# Patient Record
Sex: MALE | Race: WHITE | NOT HISPANIC OR LATINO | Employment: FULL TIME | URBAN - METROPOLITAN AREA
[De-identification: names, ages, dates, MRNs, and addresses within clinical notes are randomized per-mention and may not be internally consistent; named-entity substitution may affect disease eponyms.]

---

## 2024-01-08 DIAGNOSIS — I48.0 PAROXYSMAL ATRIAL FIBRILLATION (MULTI): ICD-10-CM

## 2024-01-08 RX ORDER — FUROSEMIDE 20 MG/1
TABLET ORAL
Qty: 90 TABLET | Refills: 1 | Status: SHIPPED | OUTPATIENT
Start: 2024-01-08 | End: 2024-01-23 | Stop reason: SDUPTHER

## 2024-01-08 NOTE — TELEPHONE ENCOUNTER
15 day supply sent in a week ago with instructions to make an appointment for future refills. TCT pharmacy but unable to speak with anyone in pharmacy department. Left VMM for patient with same information on his self identified VM and with office number to call to schedule a FUV

## 2024-01-23 ENCOUNTER — TELEPHONE (OUTPATIENT)
Dept: CARDIOLOGY | Facility: CLINIC | Age: 64
End: 2024-01-23
Payer: COMMERCIAL

## 2024-01-23 DIAGNOSIS — I48.0 PAROXYSMAL ATRIAL FIBRILLATION (MULTI): ICD-10-CM

## 2024-01-23 NOTE — TELEPHONE ENCOUNTER
LOV May 2022. Patient is scheduled for next month but needs lasix in the meantime. Note states he is taking PRN. Will cue a 30 day supply and refill at appointment if needed

## 2024-01-24 RX ORDER — FUROSEMIDE 20 MG/1
20 TABLET ORAL DAILY
Qty: 30 TABLET | Refills: 0 | Status: SHIPPED | OUTPATIENT
Start: 2024-01-24 | End: 2024-07-24

## 2024-02-25 NOTE — PROGRESS NOTES
"Primary Care Physician: Davidson Castillo DO  Date of Visit: 02/26/2024  3:00 PM EST  Location of visit: [address]     Chief Complaint:   Follow up afib  Last visit 5/16/22       HPI / Summary:   Dandre Carroll is a 63 -year-old male with history of of paroxysmal A. fib and tachycardia induced cardiomyopathy in 2017 with cardioversion at that time and normalization of LVEF about 5 months later.     interval events:   Returns for follow-up about 1.5 years.  No complaints  Denies any palpitations, chest pain, dizziness, presyncope or syncope.  Previously he felt like his heart would go \"Franklin\" when he was in A-fib.  But back in A-fib today and feels no symptoms    He also has questions about a back lesion and ED.  Has not followed up with a primary care doctor since Dr. Castillo left .        ECG 2/26/2024: A-fib with RVR  bpm      ECG 5/2022: Normal sinus rhythm    TTE 2/2018 - LVEF 60%, aortic regurg not assessed  TTE 2017 - LVEF 30%, mild to mod AI     famhx: father with DM, heart issues aortic aneurysm with surgery  sochx: nonsmoker, ETOH 2-3 beers a day         Past Medical History:  Past Medical History:   Diagnosis Date    Personal history of other diseases of the circulatory system 01/28/2019    History of cardiomyopathy        Past Surgical History:  Past Surgical History:   Procedure Laterality Date    OTHER SURGICAL HISTORY  10/17/2019    Tonsillectomy    OTHER SURGICAL HISTORY  10/12/2021    Cataract surgery    OTHER SURGICAL HISTORY  10/12/2021    Colonoscopy          Social History:  He has no history on file for tobacco use, alcohol use, and drug use.    Family History:  family history is not on file.      Allergies:  Allergies   Allergen Reactions    Bee Venom Protein (Honey Bee) Swelling    Cat Dander Itching     Sinus congestion       Outpatient Medications:  Current Outpatient Medications   Medication Instructions    furosemide (LASIX) 20 mg, oral, Daily       Physical Exam:  GENERAL: " "alert, cooperative, pleasant, in no acute distress  SKIN: warm, dry, no rash.  Probably benign nevi on his back that is dark and circumferential.  He is going to follow-up with PCP about this.  NECK: no JVD, no ZAID  CARDIAC: Regular rate and rhythm with no rubs, murmurs, or gallops  CHEST: Normal respiratory efforts, lungs clear to auscultation bilaterally.  ABDOMEN: soft, nontender, nondistended  EXTREMITIES: no edema  NEURO: Alert and oriented x 3.  Grossly normal.  Moves all 4 extremities.    Vitals:    02/26/24 1523   BP: (!) 148/101   BP Location: Left arm   Patient Position: Sitting   Pulse: (!) 111   Resp: 16   SpO2: 97%   Weight: 75.3 kg (166 lb)   Height: 1.778 m (5' 10\")     Wt Readings from Last 5 Encounters:   02/13/23 70.3 kg (155 lb)   05/16/22 68 kg (150 lb)   10/12/21 68 kg (150 lb)   03/09/21 64.9 kg (143 lb)   03/16/20 70.1 kg (154 lb 7 oz)     Body mass index is 23.82 kg/m².        Last Labs:  CMP:  Recent Labs     10/13/21  0856 11/04/19  0823 10/22/19  0836   * 131* 127*   K 4.9 4.6 4.8   CL 99 97* 95*   CO2 24 26 24   ANIONGAP 14 13 13   BUN 10 8 6   CREATININE 0.77 0.70 0.66   GLUCOSE 90 82 75     Recent Labs     10/13/21  0856 11/04/19  0823 10/22/19  0836 10/24/17  0521 10/22/17  1222   ALBUMIN 4.4 4.2 4.5   < > 3.8   ALKPHOS 65  --  48  --  57   ALT 13  --  11  --  55*   AST 21  --  18  --  43*   BILITOT 0.8  --  0.8  --  1.7*    < > = values in this interval not displayed.     CBC:  Recent Labs     10/13/21  0856 11/04/19  0823 10/22/19  0836   WBC 4.8 6.5 4.9   HGB 12.1* 13.0* 12.3*   HCT 37.3* 39.3* 37.6*    258 232   MCV 93 93 93     COAG:   Recent Labs     10/23/17  1617 10/22/17  0940   INR 1.8* 1.4*     ENDO:  Recent Labs     10/13/21  0856 11/04/19  0921 10/23/17  0530   TSH  --  1.31 7.77*   HGBA1C 5.2  --  6.0      CARDIAC:   Recent Labs     10/13/21  0856 10/22/17  0940     --    BNP  --  886*     Recent Labs     10/13/21  0856 10/22/19  0836   CHOL 196 190 "   LDLF 107* 93   HDL 76.8 81.9   TRIG 62 76     No data recorded          Assessment/Plan   63-year-old male with history of of paroxysmal A. fib and tachycardia induced cardiomyopathy in 2017 with cardioversion at that time and normalization of LVEF about 5 months later.     Atrial fibrillation, possibly paroxysmal although may be persistent  - New since last seen a year and a half ago  - No clear attributable symptoms but prior cardiomyopathy so would favor rhythm control  - Continue carvedilol as  bpm today  - Start Eliquis 5 mg twice daily  - 1 week monitor.  If in persistent A-fib favor cardioversion in 3 to 4 weeks at [facility]    Chronic HTN  - Elevated today  - Continue carvedilol, lisinopril and furosemide  - Will follow-up BP depending on A-fib results    Some aortic regurgitation on echo in 2017 and suggest rechecking particularly with use of furosemide    Recommend establishing with primary care    Follow up 6-8 weeks        Followup Appts:  No future appointments.          ____________________________________________________________  Brady Cota DO  [facility] [place]  [facility]

## 2024-02-26 ENCOUNTER — OFFICE VISIT (OUTPATIENT)
Dept: CARDIOLOGY | Facility: CLINIC | Age: 64
End: 2024-02-26
Payer: COMMERCIAL

## 2024-02-26 VITALS
HEIGHT: 70 IN | RESPIRATION RATE: 16 BRPM | WEIGHT: 166 LBS | BODY MASS INDEX: 23.77 KG/M2 | DIASTOLIC BLOOD PRESSURE: 101 MMHG | HEART RATE: 111 BPM | SYSTOLIC BLOOD PRESSURE: 148 MMHG | OXYGEN SATURATION: 97 %

## 2024-02-26 DIAGNOSIS — I35.1 NONRHEUMATIC AORTIC VALVE INSUFFICIENCY: ICD-10-CM

## 2024-02-26 DIAGNOSIS — I48.0 PAROXYSMAL ATRIAL FIBRILLATION (MULTI): Primary | ICD-10-CM

## 2024-02-26 DIAGNOSIS — I10 CHRONIC HYPERTENSION: ICD-10-CM

## 2024-02-26 PROCEDURE — 3077F SYST BP >= 140 MM HG: CPT | Performed by: INTERNAL MEDICINE

## 2024-02-26 PROCEDURE — 99215 OFFICE O/P EST HI 40 MIN: CPT | Performed by: INTERNAL MEDICINE

## 2024-02-26 PROCEDURE — 93005 ELECTROCARDIOGRAM TRACING: CPT | Performed by: INTERNAL MEDICINE

## 2024-02-26 PROCEDURE — 3080F DIAST BP >= 90 MM HG: CPT | Performed by: INTERNAL MEDICINE

## 2024-02-26 PROCEDURE — 1036F TOBACCO NON-USER: CPT | Performed by: INTERNAL MEDICINE

## 2024-02-26 RX ORDER — MAGNESIUM GLYCINATE 100 MG
CAPSULE ORAL DAILY
COMMUNITY

## 2024-02-26 RX ORDER — LISINOPRIL 10 MG/1
10 TABLET ORAL DAILY
COMMUNITY

## 2024-02-26 RX ORDER — SILDENAFIL 25 MG/1
TABLET, FILM COATED ORAL
COMMUNITY
Start: 2021-10-12

## 2024-02-26 RX ORDER — MULTIVIT WITH IRON,MINERALS
TABLET,CHEWABLE ORAL
COMMUNITY

## 2024-02-26 RX ORDER — LANOLIN ALCOHOL/MO/W.PET/CERES
1 CREAM (GRAM) TOPICAL DAILY
COMMUNITY

## 2024-02-26 RX ORDER — CARVEDILOL 6.25 MG/1
6.25 TABLET ORAL
COMMUNITY
End: 2024-06-05

## 2024-02-26 RX ORDER — ASPIRIN 81 MG/1
1 TABLET ORAL EVERY OTHER DAY
COMMUNITY
Start: 2017-10-29 | End: 2024-02-26 | Stop reason: ALTCHOICE

## 2024-02-26 RX ORDER — ACETAMINOPHEN 325 MG/1
325-650 TABLET ORAL EVERY 4 HOURS PRN
COMMUNITY
Start: 2013-12-12

## 2024-02-26 ASSESSMENT — PATIENT HEALTH QUESTIONNAIRE - PHQ9
1. LITTLE INTEREST OR PLEASURE IN DOING THINGS: NOT AT ALL
SUM OF ALL RESPONSES TO PHQ9 QUESTIONS 1 AND 2: 0
2. FEELING DOWN, DEPRESSED OR HOPELESS: NOT AT ALL

## 2024-02-26 ASSESSMENT — COLUMBIA-SUICIDE SEVERITY RATING SCALE - C-SSRS
6. HAVE YOU EVER DONE ANYTHING, STARTED TO DO ANYTHING, OR PREPARED TO DO ANYTHING TO END YOUR LIFE?: NO
1. IN THE PAST MONTH, HAVE YOU WISHED YOU WERE DEAD OR WISHED YOU COULD GO TO SLEEP AND NOT WAKE UP?: NO
2. HAVE YOU ACTUALLY HAD ANY THOUGHTS OF KILLING YOURSELF?: NO

## 2024-02-26 NOTE — PATIENT INSTRUCTIONS
Today we reviewed that you are back in atrial fibrillation with a somewhat rapid rate.  No clear attributable symptoms that you can ascertain.  Would recommend a 1 week monitor and if in A-fib persistently we should proceed with another cardioversion.  I will also prescribe Eliquis to take twice a day.  Please do not miss any doses.  We will perform cardioversion in about 1 month if you are persistently in atrial fibrillation.  I have also ordered additional blood work and an echocardiogram to be completed in the meantime    Can reestablish with primary care here in [place] regarding your nodule on the back and ED.  Perhaps your ED is related to atrial fibrillation which may improve as we treat that.    Stop aspirin when taking eliquis

## 2024-02-27 LAB
ATRIAL RATE: 108 BPM
Q ONSET: 221 MS
QRS COUNT: 18 BEATS
QRS DURATION: 88 MS
QT INTERVAL: 344 MS
QTC CALCULATION(BAZETT): 465 MS
QTC FREDERICIA: 421 MS
R AXIS: 49 DEGREES
T AXIS: 42 DEGREES
T OFFSET: 393 MS
VENTRICULAR RATE: 110 BPM

## 2024-03-08 ENCOUNTER — HOSPITAL ENCOUNTER (OUTPATIENT)
Dept: CARDIOLOGY | Facility: CLINIC | Age: 64
Discharge: HOME | End: 2024-03-08
Payer: COMMERCIAL

## 2024-03-08 DIAGNOSIS — I48.0 PAROXYSMAL ATRIAL FIBRILLATION (MULTI): ICD-10-CM

## 2024-03-08 LAB
AORTIC VALVE PEAK VELOCITY: 1.16 M/S
AV PEAK GRADIENT: 5.4 MMHG
AVA (PEAK VEL): 2.62 CM2
EJECTION FRACTION APICAL 4 CHAMBER: 57.1
EJECTION FRACTION: 57 %
LEFT ATRIUM VOLUME AREA LENGTH INDEX BSA: 46.7 ML/M2
LEFT VENTRICULAR OUTFLOW TRACT DIAMETER: 2.19 CM
RIGHT VENTRICLE FREE WALL PEAK S': 9.68 CM/S
RIGHT VENTRICLE PEAK SYSTOLIC PRESSURE: 28.8 MMHG
TRICUSPID ANNULAR PLANE SYSTOLIC EXCURSION: 1.9 CM

## 2024-03-08 PROCEDURE — 93248 EXT ECG>7D<15D REV&INTERPJ: CPT | Performed by: INTERNAL MEDICINE

## 2024-03-08 PROCEDURE — 93246 EXT ECG>7D<15D RECORDING: CPT

## 2024-03-08 PROCEDURE — 93306 TTE W/DOPPLER COMPLETE: CPT

## 2024-03-08 PROCEDURE — 93306 TTE W/DOPPLER COMPLETE: CPT | Performed by: INTERNAL MEDICINE

## 2024-03-14 ENCOUNTER — LAB (OUTPATIENT)
Dept: LAB | Facility: LAB | Age: 64
End: 2024-03-14
Payer: COMMERCIAL

## 2024-03-14 DIAGNOSIS — I48.0 PAROXYSMAL ATRIAL FIBRILLATION (MULTI): ICD-10-CM

## 2024-03-14 LAB
ALBUMIN SERPL BCP-MCNC: 4.5 G/DL (ref 3.4–5)
ALP SERPL-CCNC: 51 U/L (ref 33–136)
ALT SERPL W P-5'-P-CCNC: 12 U/L (ref 10–52)
ANION GAP SERPL CALC-SCNC: 12 MMOL/L (ref 10–20)
AST SERPL W P-5'-P-CCNC: 16 U/L (ref 9–39)
BILIRUB SERPL-MCNC: 0.9 MG/DL (ref 0–1.2)
BUN SERPL-MCNC: 14 MG/DL (ref 6–23)
CALCIUM SERPL-MCNC: 9.5 MG/DL (ref 8.6–10.6)
CHLORIDE SERPL-SCNC: 98 MMOL/L (ref 98–107)
CHOLEST SERPL-MCNC: 205 MG/DL (ref 0–199)
CHOLESTEROL/HDL RATIO: 2.4
CO2 SERPL-SCNC: 29 MMOL/L (ref 21–32)
CREAT SERPL-MCNC: 0.94 MG/DL (ref 0.5–1.3)
EGFRCR SERPLBLD CKD-EPI 2021: >90 ML/MIN/1.73M*2
ERYTHROCYTE [DISTWIDTH] IN BLOOD BY AUTOMATED COUNT: 12.7 % (ref 11.5–14.5)
GLUCOSE SERPL-MCNC: 88 MG/DL (ref 74–99)
HCT VFR BLD AUTO: 42 % (ref 41–52)
HDLC SERPL-MCNC: 85.2 MG/DL
HGB BLD-MCNC: 13.9 G/DL (ref 13.5–17.5)
LDLC SERPL CALC-MCNC: 104 MG/DL
MCH RBC QN AUTO: 30.5 PG (ref 26–34)
MCHC RBC AUTO-ENTMCNC: 33.1 G/DL (ref 32–36)
MCV RBC AUTO: 92 FL (ref 80–100)
NON HDL CHOLESTEROL: 120 MG/DL (ref 0–149)
NRBC BLD-RTO: 0 /100 WBCS (ref 0–0)
PLATELET # BLD AUTO: 237 X10*3/UL (ref 150–450)
POTASSIUM SERPL-SCNC: 5 MMOL/L (ref 3.5–5.3)
PROT SERPL-MCNC: 6.8 G/DL (ref 6.4–8.2)
RBC # BLD AUTO: 4.56 X10*6/UL (ref 4.5–5.9)
SODIUM SERPL-SCNC: 134 MMOL/L (ref 136–145)
TRIGL SERPL-MCNC: 79 MG/DL (ref 0–149)
TSH SERPL-ACNC: 1.5 MIU/L (ref 0.44–3.98)
VLDL: 16 MG/DL (ref 0–40)
WBC # BLD AUTO: 5 X10*3/UL (ref 4.4–11.3)

## 2024-03-14 PROCEDURE — 80061 LIPID PANEL: CPT

## 2024-03-14 PROCEDURE — 84443 ASSAY THYROID STIM HORMONE: CPT

## 2024-03-14 PROCEDURE — 85027 COMPLETE CBC AUTOMATED: CPT

## 2024-03-14 PROCEDURE — 80053 COMPREHEN METABOLIC PANEL: CPT

## 2024-03-14 PROCEDURE — 36415 COLL VENOUS BLD VENIPUNCTURE: CPT

## 2024-03-20 ENCOUNTER — TELEPHONE (OUTPATIENT)
Dept: CARDIOLOGY | Facility: CLINIC | Age: 64
End: 2024-03-20
Payer: COMMERCIAL

## 2024-03-20 NOTE — TELEPHONE ENCOUNTER
----- Message from Brady Cota, DO sent at 3/11/2024 10:04 PM EDT -----  Echo looks ok. Do we have monitor results yet? If persistent afib, favor DCC.

## 2024-03-20 NOTE — TELEPHONE ENCOUNTER
TCT patient and left detailed message on self identified VM regarding normal lab/echo results and that we will decide on cardioversion once event monitor is sent back and reviewed. Instructed to call the office with any questions/concerns

## 2024-03-21 ENCOUNTER — APPOINTMENT (OUTPATIENT)
Dept: CARDIOLOGY | Facility: HOSPITAL | Age: 64
End: 2024-03-21
Payer: COMMERCIAL

## 2024-04-02 DIAGNOSIS — I48.0 PAROXYSMAL ATRIAL FIBRILLATION (MULTI): Primary | ICD-10-CM

## 2024-04-05 DIAGNOSIS — I48.0 PAROXYSMAL ATRIAL FIBRILLATION (MULTI): ICD-10-CM

## 2024-04-05 NOTE — TELEPHONE ENCOUNTER
Patient will defer DCCV until after EP consult. Script cued and forwarded for signature per his request.

## 2024-04-11 ENCOUNTER — APPOINTMENT (OUTPATIENT)
Dept: CARDIOLOGY | Facility: HOSPITAL | Age: 64
End: 2024-04-11
Payer: COMMERCIAL

## 2024-04-23 ENCOUNTER — OFFICE VISIT (OUTPATIENT)
Dept: CARDIOLOGY | Facility: CLINIC | Age: 64
End: 2024-04-23
Payer: COMMERCIAL

## 2024-04-23 VITALS
HEIGHT: 70 IN | DIASTOLIC BLOOD PRESSURE: 94 MMHG | OXYGEN SATURATION: 98 % | WEIGHT: 168.9 LBS | HEART RATE: 113 BPM | SYSTOLIC BLOOD PRESSURE: 133 MMHG | BODY MASS INDEX: 24.18 KG/M2

## 2024-04-23 DIAGNOSIS — I48.0 PAROXYSMAL ATRIAL FIBRILLATION (MULTI): ICD-10-CM

## 2024-04-23 PROCEDURE — 99204 OFFICE O/P NEW MOD 45 MIN: CPT | Performed by: INTERNAL MEDICINE

## 2024-04-23 PROCEDURE — 99214 OFFICE O/P EST MOD 30 MIN: CPT | Performed by: INTERNAL MEDICINE

## 2024-04-23 PROCEDURE — 93005 ELECTROCARDIOGRAM TRACING: CPT | Performed by: INTERNAL MEDICINE

## 2024-04-23 PROCEDURE — 1036F TOBACCO NON-USER: CPT | Performed by: INTERNAL MEDICINE

## 2024-04-23 RX ORDER — FLECAINIDE ACETATE 100 MG/1
100 TABLET ORAL 2 TIMES DAILY
Qty: 60 TABLET | Refills: 11 | Status: SHIPPED | OUTPATIENT
Start: 2024-04-23 | End: 2024-06-05 | Stop reason: ALTCHOICE

## 2024-04-23 ASSESSMENT — PAIN SCALES - GENERAL: PAINLEVEL: 0-NO PAIN

## 2024-04-23 ASSESSMENT — COLUMBIA-SUICIDE SEVERITY RATING SCALE - C-SSRS
6. HAVE YOU EVER DONE ANYTHING, STARTED TO DO ANYTHING, OR PREPARED TO DO ANYTHING TO END YOUR LIFE?: NO
2. HAVE YOU ACTUALLY HAD ANY THOUGHTS OF KILLING YOURSELF?: NO
1. IN THE PAST MONTH, HAVE YOU WISHED YOU WERE DEAD OR WISHED YOU COULD GO TO SLEEP AND NOT WAKE UP?: NO

## 2024-04-23 ASSESSMENT — PATIENT HEALTH QUESTIONNAIRE - PHQ9
SUM OF ALL RESPONSES TO PHQ9 QUESTIONS 1 AND 2: 0
1. LITTLE INTEREST OR PLEASURE IN DOING THINGS: NOT AT ALL
2. FEELING DOWN, DEPRESSED OR HOPELESS: NOT AT ALL

## 2024-04-23 ASSESSMENT — ENCOUNTER SYMPTOMS
OCCASIONAL FEELINGS OF UNSTEADINESS: 0
DEPRESSION: 0
LOSS OF SENSATION IN FEET: 0

## 2024-04-23 NOTE — PROGRESS NOTES
Referred by Brady Cruz DO provider found for No chief complaint on file.       Dandre Carroll is a 63 y.o. year old male patient with h/o paroxysmal A. fib and tachycardia induced cardiomyopathy in 2017 with cardioversion at that time and normalization of LVEF about 5 months later. The patient was seen in his cardiologist office and found to be now in persistent A Fib. Was referred to me for evaluation for RF ablation.      PMHx/PSHx: As above    FamHx: unremarkable     Allergies:  Allergies   Allergen Reactions    Bee Venom Protein (Honey Bee) Swelling    Cat Dander Itching     Sinus congestion        Review of Systems    Constitutional: not feeling tired.   Eyes: no eyesight problems.   ENT: no hearing loss and no nosebleeds.   Cardiovascular: no intermittent leg claudication and as noted in HPI.   Respiratory: no chronic cough and no shortness of breath.   Gastrointestinal: no change in bowel habits and no blood in stools.   Genitourinary: no urinary frequency and no hematuria.   Skin: no skin rashes.   Neurological: no seizures and no frequent falls.   Psychiatric: no depression and not suicidal.   All other systems have been reviewed and are negative for complaint.     Outpatient Medications:  Current Outpatient Medications   Medication Instructions    acetaminophen (TYLENOL) 325-650 mg, oral, Every 4 hours PRN    apixaban (ELIQUIS) 5 mg, oral, 2 times daily    carvedilol (COREG) 6.25 mg, oral, 2 times daily with meals    folic acid-vit B6-vit B12 (Folbic) 2.5-25-2 mg tablet 1 tablet, oral, Daily    furosemide (LASIX) 20 mg, oral, Daily    glucosamine-chondroitin 250-200 mg tablet oral    lisinopril 10 mg, oral, Daily, as directed    magnesium glycinate 100 mg magnesium capsule oral, Daily    sildenafil (Viagra) 25 mg tablet oral    thiamine 100 mg tablet 1 tablet, oral, Daily         Last Recorded Vitals:      3/16/2020     3:51 PM 3/9/2021     3:03 PM 10/12/2021     9:33 AM 5/16/2022     4:09 PM  "2/13/2023    10:41 AM 2/26/2024     3:23 PM   Vitals   Systolic 134 119 122 111 144 148   Diastolic 76 65 68 61 90 101   Heart Rate 70 58 53 54 96 111   Temp   35.7 °C (96.3 °F)  36.1 °C (97 °F)    Resp   116  16 16   Height (in) 1.778 m (5' 10\")  1.721 m (5' 7.75\") 1.721 m (5' 7.75\") 1.778 m (5' 10\") 1.778 m (5' 10\")   Weight (lb) 154.44 143 150 150 155 166   BMI 22.16 kg/m2 20.52 kg/m2 22.98 kg/m2 22.98 kg/m2 22.24 kg/m2 23.82 kg/m2   BSA (m2) 1.86 m2 1.79 m2 1.8 m2 1.8 m2 1.86 m2 1.93 m2   Visit Report      Report    Visit Vitals  Smoking Status Former        Physical Exam:  Constitutional: alert and in no acute distress.   Eyes: no erythema, swelling or discharge from the eye .   Neck: neck is supple, symmetric, trachea midline, no masses  and no thyromegaly .   Pulmonary: no increased work of breathing or signs of respiratory distress  and lungs clear to auscultation.    Cardiovascular: carotid pulses 2+ bilaterally with no bruit , JVP was normal, no thrills , regular rhythm, normal S1 and S2, no murmurs , pedal pulses 2+ bilaterally  and no edema .   Abdomen: abdomen non-tender, no masses  and no hepatomegaly .   Skin: skin warm and dry, normal skin turgor .   Psychiatric judgment and insight is normal  and oriented to person, place and time .        Assessment/Plan   Problem List Items Addressed This Visit    None  Visit Diagnoses         Codes    Paroxysmal atrial fibrillation (Multi)     I48.0            Dandre Carroll is a 63 y.o. year old male patient with h/o paroxysmal A. fib and tachycardia induced cardiomyopathy in 2017 with cardioversion at that time and normalization of LVEF about 5 months later. The patient was seen in his cardiologist office and found to be now in persistent A Fib. Was referred to me for evaluation for RF ablation.    His current ECG shows A Fib with narrow QRS and HR of 113 bpm. I think the patient will benefit from rhythm control. I discussed with him and his wife his options " including DCC + AAD vs RF ablation. All the R/B/A of these options were discussed with the patient who expressed understanding. He would like to try medications first. His most recent echocardiogram showed a preserved LVEF. Will get him started on Flecainide 100 mg bid and will proceed with DCC as scheduled by Dr. Cota. If he experiences recurrence on the medication then will proceed with ablation.       Renato Reeves MD  Cardiac Electrophysiology      Thank you very much for allowing me to participate in the care of this pleasant patient. Please do not hesitate to contact me with any further questions or concerns regarding his care.    **Disclaimer: This note was dictated by speech recognition, and every effort has been made to prevent any error in transcription, however minor errors may be present**

## 2024-04-24 DIAGNOSIS — I48.0 PAROXYSMAL ATRIAL FIBRILLATION (MULTI): Primary | ICD-10-CM

## 2024-04-24 RX ORDER — SODIUM CHLORIDE 9 MG/ML
100 INJECTION, SOLUTION INTRAVENOUS CONTINUOUS
OUTPATIENT
Start: 2024-04-24

## 2024-04-25 ENCOUNTER — HOSPITAL ENCOUNTER (OUTPATIENT)
Dept: CARDIOLOGY | Facility: HOSPITAL | Age: 64
Discharge: HOME | End: 2024-04-25
Payer: COMMERCIAL

## 2024-04-25 ENCOUNTER — ANESTHESIA EVENT (OUTPATIENT)
Dept: CARDIOLOGY | Facility: HOSPITAL | Age: 64
End: 2024-04-25
Payer: COMMERCIAL

## 2024-04-25 ENCOUNTER — ANESTHESIA (OUTPATIENT)
Dept: CARDIOLOGY | Facility: HOSPITAL | Age: 64
End: 2024-04-25
Payer: COMMERCIAL

## 2024-04-25 VITALS
HEART RATE: 78 BPM | OXYGEN SATURATION: 97 % | RESPIRATION RATE: 10 BRPM | WEIGHT: 165 LBS | BODY MASS INDEX: 23.62 KG/M2 | DIASTOLIC BLOOD PRESSURE: 79 MMHG | TEMPERATURE: 98.4 F | SYSTOLIC BLOOD PRESSURE: 133 MMHG | HEIGHT: 70 IN

## 2024-04-25 DIAGNOSIS — I48.0 PAROXYSMAL ATRIAL FIBRILLATION (MULTI): ICD-10-CM

## 2024-04-25 LAB
ATRIAL RATE: 97 BPM
BODY SURFACE AREA: 1.92 M2
Q ONSET: 226 MS
QRS COUNT: 18 BEATS
QRS DURATION: 72 MS
QT INTERVAL: 324 MS
QTC CALCULATION(BAZETT): 444 MS
QTC FREDERICIA: 400 MS
R AXIS: 27 DEGREES
T AXIS: 29 DEGREES
T OFFSET: 388 MS
VENTRICULAR RATE: 113 BPM

## 2024-04-25 PROCEDURE — 3700000002 HC GENERAL ANESTHESIA TIME - EACH INCREMENTAL 1 MINUTE

## 2024-04-25 PROCEDURE — 7100000009 HC PHASE TWO TIME - INITIAL BASE CHARGE

## 2024-04-25 PROCEDURE — 92960 CARDIOVERSION ELECTRIC EXT: CPT | Performed by: INTERNAL MEDICINE

## 2024-04-25 PROCEDURE — 7100000010 HC PHASE TWO TIME - EACH INCREMENTAL 1 MINUTE

## 2024-04-25 PROCEDURE — A92960 PR CARDIOVERSION, ELECTIVE;EXTERN: Performed by: STUDENT IN AN ORGANIZED HEALTH CARE EDUCATION/TRAINING PROGRAM

## 2024-04-25 PROCEDURE — A92960 PR CARDIOVERSION, ELECTIVE;EXTERN: Performed by: ANESTHESIOLOGIST ASSISTANT

## 2024-04-25 PROCEDURE — 3700000001 HC GENERAL ANESTHESIA TIME - INITIAL BASE CHARGE

## 2024-04-25 PROCEDURE — 2500000004 HC RX 250 GENERAL PHARMACY W/ HCPCS (ALT 636 FOR OP/ED): Performed by: ANESTHESIOLOGIST ASSISTANT

## 2024-04-25 PROCEDURE — 92960 CARDIOVERSION ELECTRIC EXT: CPT

## 2024-04-25 PROCEDURE — 2500000005 HC RX 250 GENERAL PHARMACY W/O HCPCS: Performed by: ANESTHESIOLOGIST ASSISTANT

## 2024-04-25 PROCEDURE — 99222 1ST HOSP IP/OBS MODERATE 55: CPT | Performed by: NURSE PRACTITIONER

## 2024-04-25 RX ORDER — PROPOFOL 10 MG/ML
INJECTION, EMULSION INTRAVENOUS AS NEEDED
Status: DISCONTINUED | OUTPATIENT
Start: 2024-04-25 | End: 2024-04-25

## 2024-04-25 RX ORDER — LIDOCAINE HYDROCHLORIDE 20 MG/ML
INJECTION, SOLUTION EPIDURAL; INFILTRATION; INTRACAUDAL; PERINEURAL AS NEEDED
Status: DISCONTINUED | OUTPATIENT
Start: 2024-04-25 | End: 2024-04-25

## 2024-04-25 RX ADMIN — LIDOCAINE HYDROCHLORIDE 50 MG: 20 INJECTION, SOLUTION EPIDURAL; INFILTRATION; INTRACAUDAL; PERINEURAL at 13:04

## 2024-04-25 RX ADMIN — PROPOFOL 50 MG: 10 INJECTION, EMULSION INTRAVENOUS at 13:04

## 2024-04-25 RX ADMIN — SODIUM CHLORIDE: 9 INJECTION, SOLUTION INTRAVENOUS at 12:59

## 2024-04-25 ASSESSMENT — ENCOUNTER SYMPTOMS
GASTROINTESTINAL NEGATIVE: 1
NEUROLOGICAL NEGATIVE: 1
CARDIOVASCULAR NEGATIVE: 1
EYES NEGATIVE: 1
HEMATOLOGIC/LYMPHATIC NEGATIVE: 1
CONSTITUTIONAL NEGATIVE: 1
MUSCULOSKELETAL NEGATIVE: 1
RESPIRATORY NEGATIVE: 1
ENDOCRINE NEGATIVE: 1
PSYCHIATRIC NEGATIVE: 1
ALLERGIC/IMMUNOLOGIC NEGATIVE: 1

## 2024-04-25 ASSESSMENT — PAIN SCALES - GENERAL
PAINLEVEL_OUTOF10: 0 - NO PAIN

## 2024-04-25 ASSESSMENT — COLUMBIA-SUICIDE SEVERITY RATING SCALE - C-SSRS
1. IN THE PAST MONTH, HAVE YOU WISHED YOU WERE DEAD OR WISHED YOU COULD GO TO SLEEP AND NOT WAKE UP?: NO
2. HAVE YOU ACTUALLY HAD ANY THOUGHTS OF KILLING YOURSELF?: NO
6. HAVE YOU EVER DONE ANYTHING, STARTED TO DO ANYTHING, OR PREPARED TO DO ANYTHING TO END YOUR LIFE?: NO

## 2024-04-25 ASSESSMENT — PAIN - FUNCTIONAL ASSESSMENT
PAIN_FUNCTIONAL_ASSESSMENT: 0-10

## 2024-04-25 NOTE — DISCHARGE INSTRUCTIONS
No changes to medications.     Follow up with Dr. Cota in the next 4-6 weeks. Appointment requested. Please call the office if you do not hear anything in 3 business days or if you need to reschedule.                  Cardioversion     Cardioversion is a non-surgical way to restore your heart's normal rhythm. Medication may be tried first to correct an irregular heartbeat, but if medicines do not work, electrical cardioversion may be needed. The electrical current should make your heartbeat normally again.      After the procedure-    Your heart rate, blood pressure and respirations will be checked frequently by the nurse until you are fully alert.      When the patches are removed form your chest, you may notice redness, irritation, or itching similar to a mild sunburn. You may You may use over the counter hydrocortisone 1% cream and apply up to three times a day for any irritation to your skin on your chest.      Discharge information-   Have an adult with you to get you home from the hospital; you will not be allowed to drive for 24 hours after the procedure.      You may resume your usual routine after discharge.      Make sure you and your family understands how you are to take your medications. The doctor will tell you what to do with your cardiac medicines and your anti-coagulation medicines.      There is a small chance your irregular heartbeat may return. If you feel skipped beats, rapid heart rate, or chest tightness, call your doctor.

## 2024-04-25 NOTE — ANESTHESIA PREPROCEDURE EVALUATION
Patient: Dandre Carroll    Procedure Information       Date/Time: 04/25/24 1300    Scheduled providers: Fred Parmar MD; MANOLO Whitaker; Brady Cota DO    Procedure: CARDIOVERSION EXTERNAL    Location: [facility]            Relevant Problems   No relevant active problems       Clinical information reviewed:   Tobacco  Allergies  Meds   Med Hx  Surg Hx   Fam Hx  Soc Hx        NPO Detail:  NPO/Void Status  Date of Last Liquid: 04/25/24  Time of Last Liquid: 0700  Date of Last Solid: 04/24/24  Time of Last Solid: 2000         Physical Exam    Airway  Mallampati: II  TM distance: >3 FB  Neck ROM: full     Cardiovascular   Rhythm: regular  Rate: normal     Dental    Pulmonary   Breath sounds clear to auscultation     Abdominal            Anesthesia Plan    History of general anesthesia?: yes  History of complications of general anesthesia?: no    ASA 3     MAC     intravenous induction   Anesthetic plan and risks discussed with patient.    Plan discussed with CRNA.

## 2024-04-25 NOTE — ANESTHESIA POSTPROCEDURE EVALUATION
Patient: Dandre Carroll    Procedure Summary       Date: 04/25/24 Room / Location: [facility]    Anesthesia Start: 1259 Anesthesia Stop: 1309    Procedure: CARDIOVERSION EXTERNAL Diagnosis: Paroxysmal atrial fibrillation (Multi)    Scheduled Providers: Fred Parmar MD; MANOLO Whitaker; Brady Cota DO Responsible Provider: Mauricio Mena MD    Anesthesia Type: MAC ASA Status: 3            Anesthesia Type: MAC    Vitals Value Taken Time   /79 04/25/24 1356   Temp  04/25/24 1444   Pulse 78 04/25/24 1356   Resp 10 04/25/24 1356   SpO2 97 % 04/25/24 1356       Anesthesia Post Evaluation    Patient location during evaluation: bedside  Patient participation: complete - patient participated  Level of consciousness: awake  Pain management: adequate  Multimodal analgesia pain management approach  Airway patency: patent  Cardiovascular status: stable  Respiratory status: spontaneous ventilation and unassisted  Hydration status: acceptable  Postoperative Nausea and Vomiting: none  Comments: No significant PONV.        No notable events documented.

## 2024-04-25 NOTE — NURSING NOTE
Home going instructions specific to procedure given. Easily arousable; responding appropriately. Vs +/- 20% of pre procedure status. Significant complications are absent. Ambulates without dizziness/age appropriate activity, pulse ox above or equal to 92% on room air/ordered oxygen treatment. Care Plan Complete. Discharge to home accompanied by (family). Discharged via wheelchair.

## 2024-04-25 NOTE — H&P
History Of Present Illness  Dandre Carroll is a 63 y.o. male presenting with atrial fibrillation (on Eliquis, Flecainide, Carvedilol) here for DCCV. PMHx significant for  atrial fibrillation and tachycardia induced cardiomyopathy in 2017 with cardioversion and normalization of LVEF, HTN, aortic regurgitation.     Denies missed doses of Eliquis over the past 30 days.      Past Medical History  He has a past medical history of Atrial fibrillation (Multi), Hypertension, and Personal history of other diseases of the circulatory system (01/28/2019).    Surgical History  He has a past surgical history that includes Other surgical history (10/17/2019); Other surgical history (10/12/2021); and Other surgical history (10/12/2021).     Social History  He reports that he quit smoking about 38 years ago. His smoking use included cigarettes. He started smoking about 49 years ago. He has a 5.5 pack-year smoking history. He has never used smokeless tobacco. He reports current alcohol use of about 14.0 standard drinks of alcohol per week. He reports that he does not use drugs.    Family History  Family History   Problem Relation Name Age of Onset    Aortic aneurysm Father          Allergies  Bee venom protein (honey bee) and Cat dander    Home Medications  Current Outpatient Medications on File Prior to Encounter   Medication Sig Dispense Refill    apixaban (Eliquis) 5 mg tablet Take 1 tablet (5 mg) by mouth 2 times a day. 60 tablet 11    carvedilol (Coreg) 6.25 mg tablet Take 1 tablet (6.25 mg) by mouth 2 times a day with meals.      flecainide (Tambocor) 100 mg tablet Take 1 tablet (100 mg) by mouth 2 times a day. 60 tablet 11    folic acid-vit B6-vit B12 (Folbic) 2.5-25-2 mg tablet Take 1 tablet by mouth once daily.      glucosamine-chondroitin 250-200 mg tablet Take by mouth.      lisinopril 10 mg tablet Take 1 tablet (10 mg) by mouth once daily. as directed      magnesium glycinate 100 mg magnesium capsule Take by mouth once  daily.      thiamine 100 mg tablet Take 1 tablet (100 mg) by mouth once daily.      acetaminophen (Tylenol) 325 mg tablet Take 1-2 tablets (325-650 mg) by mouth every 4 hours if needed.      furosemide (Lasix) 20 mg tablet Take 1 tablet (20 mg) by mouth once daily. 30 tablet 0    sildenafil (Viagra) 25 mg tablet Take by mouth.       No current facility-administered medications on file prior to encounter.          Inpatient Medications:            Review of Systems   Constitutional: Negative.    HENT: Negative.     Eyes: Negative.    Respiratory: Negative.     Cardiovascular: Negative.    Gastrointestinal: Negative.    Endocrine: Negative.    Genitourinary: Negative.    Musculoskeletal: Negative.    Skin: Negative.    Allergic/Immunologic: Negative.    Neurological: Negative.    Hematological: Negative.    Psychiatric/Behavioral: Negative.            Physical Exam    General:  Patient is awake, alert, and oriented.  Patient is in no acute distress.  HEENT:  Pupils equal and reactive.  Normocephalic.  Moist mucosa.    Neck:  No JVD.   Cardiovascular:  IRR.  No murmurs/rubs/gallops. Radial pulses 2+.   Pulmonary:  Clear to auscultation bilaterally.  Abdomen:  Soft. Non-tender.   Non-distended.  Positive bowel sounds.  Lower Extremities:  Pedal pulses 2+ No LE edema.  Neurologic:  Cranial nerves II-XII grossly intact.   No focal deficit.   Skin: Skin warm and dry, no lesions. Normal skin turgor.   Psychiatric: Normal affect.    Sedation Plan    ASA 3     Mallampati class: II.         Last Recorded Vitals  Blood pressure (!) 155/96, pulse 110, temperature 36.9 °C (98.4 °F).         Vitals from the Past 24 Hours  Heart Rate:  [110]   Temp:  [36.9 °C (98.4 °F)]   BP: (155)/(96)          Relevant Results    Labs    CBC:   Recent Labs     03/14/24  0855 10/13/21  0856 11/04/19  0823 10/22/19  0836 10/28/17  0511 10/27/17  0504   WBC 5.0 4.8 6.5 4.9 5.5 5.7   HGB 13.9 12.1* 13.0* 12.3* 15.1 17.1   HCT 42.0 37.3* 39.3* 37.6*  "44.0 46.6    249 258 232 166 165   MCV 92 93 93 93 90 89     BMP/CMP:   Recent Labs     03/14/24  0855 10/13/21  0856 11/04/19  0823 10/22/19  0836 10/27/17  0504 10/26/17  0510 10/23/17  0530 10/22/17  1222   * 132* 131* 127* 133* 136   < >  --    K 5.0 4.9 4.6 4.8 3.4* 3.3*   < >  --    CL 98 99 97* 95* 94* 97*   < >  --    BUN 14 10 8 6 34* 43*   < >  --    CREATININE 0.94 0.77 0.70 0.66 1.32* 1.77*   < >  --    CO2 29 24 26 24 32 27   < >  --    CALCIUM 9.5 9.6 9.6 9.6 8.8 9.0   < >  --    PROT 6.8 6.9  --  6.9  --   --   --  6.1*   BILITOT 0.9 0.8  --  0.8  --   --   --  1.7*   ALKPHOS 51 65  --  48  --   --   --  57   ALT 12 13  --  11  --   --   --  55*   AST 16 21  --  18  --   --   --  43*   GLUCOSE 88 90 82 75 87 89   < >  --     < > = values in this interval not displayed.      Lipid Panel:   Recent Labs     03/14/24  0855 10/13/21  0856 10/22/19  0836   CHOL 205* 196 190   HDL 85.2 76.8 81.9   CHHDL 2.4 2.6 2.3   VLDL 16 12 15   TRIG 79 62 76   NHDL 120  --   --      Cardiac       No lab exists for component: \"CK\", \"CKMBP\"   Hemoglobin A1C:   Recent Labs     10/13/21  0856 10/23/17  0530   HGBA1C 5.2 6.0     TSH/ Free T4:   Recent Labs     03/14/24  0855 11/04/19  0921 10/24/17  0521 10/23/17  0530   TSH 1.50 1.31  --  7.77*   FREET4  --   --  1.26  --      Iron:   Recent Labs     10/13/21  0856 10/25/17  0457 10/24/17  0521 10/22/17  0940   FERRITIN 183  --   --   --    TIBC 305 253 249  --    IRONSAT 42 13* 14*  --    BNP  --   --   --  886*     Coag:     ABO: No results found for: \"ABO\"    Past Cardiology Tests (Last 3 Years):  EKG:  Encounter Date: 04/23/24   ECG 12 lead (Clinic Performed)   Result Value    Ventricular Rate 113    Atrial Rate 97    QRS Duration 72    QT Interval 324    QTC Calculation(Bazett) 444    R Axis 27    T Axis 29    QRS Count 18    Q Onset 226    T Offset 388    QTC Fredericia 400    Narrative    Atrial fibrillation with rapid ventricular response  Septal " infarct , age undetermined  Abnormal ECG  When compared with ECG of 26-FEB-2024 14:53,  Septal infarct is now Present  Confirmed by Renato Reeves ([id number]) on 4/25/2024 9:04:42 AM     Echo:  Results for orders placed during the hospital encounter of 03/08/24    Transthoracic echo (TTE) complete    Narrative  [facility], [address], [place] [address]  Tel [phone] and Fax [phone]    TRANSTHORACIC ECHOCARDIOGRAM REPORT      Patient Name:      SARAH Fuentes Physician:    [id number] Montelongo DO  Study Date:        3/8/2024             Ordering Provider:    [id number] MONTELONGO  MRN/PID:           [id number]             Fellow:  Accession#:        [id number]         Nurse:  Date of Birth/Age: 1960 / 63 years  Sonographer:          Елена Scott RDCS, RVT  Gender:            M                    Additional Staff:  Height:            177.80 cm            Admit Date:  Weight:            84.37 kg             Admission Status:     Outpatient  BSA / BMI:         2.02 m2 / 26.69      Encounter#:           [id number]  kg/m2  Department Location:  [facility]  Blood Pressure: 112 /78 mmHg    Study Type:    TRANSTHORACIC ECHO (TTE) COMPLETE  Diagnosis/ICD: Paroxysmal atrial fibrillation-I48.0  Indication:    Paroxysmal afib.  CPT Code:      Echo Complete w Full Doppler-[id number]    Patient History:  Pertinent History: Paroxysmal afib. COPD. CHF.    Study Detail: Technically challenging study due to poor parasternal windows.      PHYSICIAN INTERPRETATION:  Left Ventricle: The left ventricular systolic function is normal, with an estimated ejection fraction of 55-60%. The patient is in atrial fibrillation which may influence the estimate of left ventricular function and transvalvular flows. There are no regional wall motion abnormalities. The left ventricular cavity size is normal. Left ventricular diastolic filling was indeterminate.  Left Atrium: The left atrium is moderately  dilated.  Right Ventricle: The right ventricle is normal in size. There is normal right ventricular global systolic function.  Right Atrium: The right atrium is normal in size.  Aortic Valve: The aortic valve was not well visualized. There is trivial aortic valve regurgitation. The peak instantaneous gradient of the aortic valve is 5.4 mmHg.  Mitral Valve: The mitral valve is normal in structure. There is trace mitral valve regurgitation.  Tricuspid Valve: The tricuspid valve is structurally normal. There is trace tricuspid regurgitation.  Pulmonic Valve: The pulmonic valve is not well visualized. There is no indication of pulmonic valve regurgitation.  Pericardium: There is no pericardial effusion noted.  Aorta: The aortic root is normal.      CONCLUSIONS:  1. Left ventricular systolic function is normal with a 55-60% estimated ejection fraction.  2. The left atrium is moderately dilated.  3. HR 110s-120s.  4. The patient is in atrial fibrillation which may influence the estimate of left ventricular function and transvalvular flows.    QUANTITATIVE DATA SUMMARY:  LA VOLUME:  Normal Ranges:  LA Vol A4C:        102.6 ml   (22+/-6mL/m2)  LA Vol A2C:        85.7 ml  LA Vol BP:         94.5 ml  LA Vol Index A4C:  50.7 ml/m2  LA Vol Index A2C:  42.3 ml/m2  LA Vol Index BP:   46.7 ml/m2  LA Area A4C:       27.8 cm2  LA Area A2C:       25.6 cm2  LA Major Axis A4C: 6.4 cm  LA Major Axis A2C: 6.5 cm  LA Vol A4C:        97.0 ml  LA Vol A2C:        81.1 ml    RA VOLUME BY A/L METHOD:  Normal Ranges:  RA Area A4C: 13.9 cm2    LV SYSTOLIC FUNCTION BY 2D PLANIMETRY (MOD):  Normal Ranges:  EF-A4C View: 57.1 % (>=55%)  EF-A2C View: 53.7 %  EF-Biplane:  56.9 %    LV DIASTOLIC FUNCTION:  Normal Ranges:  MV Peak E:    0.78 m/s (0.7-1.2 m/s)  MV lateral e' 0.13 m/s  MV medial e'  0.11 m/s    MITRAL VALVE:  Normal Ranges:  MV DT: 154 msec (150-240msec)    AORTIC VALVE:  Normal Ranges:  AoV Vmax:      1.16 m/s (<=1.7m/s)  AoV Peak PG:    5.4 mmHg (<20mmHg)  LVOT Max Manjinder:  0.81 m/s (<=1.1m/s)  LVOT VTI:      14.38 cm  LVOT Diameter: 2.19 cm  (1.8-2.4cm)  AoV Area,Vmax: 2.62 cm2 (2.5-4.5cm2)      RIGHT VENTRICLE:  RV Basal 3.00 cm  RV Mid   2.20 cm  RV Major 6.2 cm  TAPSE:   19.0 mm  RV s'    0.10 m/s    TRICUSPID VALVE/RVSP:  Normal Ranges:  Peak TR Velocity: 2.54 m/s  RV Syst Pressure: 28.8 mmHg (< 30mmHg)  IVC Diam:         1.40 cm      [id number] Brady Cota DO  Electronically signed on 3/8/2024 at 4:37:01 PM        ** Final **    Ejection Fractions:  LV EF   Date/Time Value Ref Range Status   03/08/2024 02:45 PM 57 %      Cath:  No results found for this or any previous visit.    Stress Test:  No results found for this or any previous visit.    Cardiac Imaging:  No results found for this or any previous visit.        === 10/23/17 ===    - Impression -  No CT evidence for pulmonary embolism. Dilated main pulmonary artery  suggests pulmonary hypertension.    Cardiomegaly with bilateral pleural effusions, body wall edema, and  abdominal ascites.     Assessment/Plan  Assessment/Plan   Active Problems:  There are no active Hospital Problems.        #Persistent Atrial Fibrillation  -DCCV with Dr. Cota today 4/25/24       I spent 30 minutes in the professional and overall care of this patient.      Kaley Monge, JILLIAN-CNP

## 2024-06-04 NOTE — PROGRESS NOTES
(-) acute SOB / CP  (+) malaise  (+) suboptimal appetite    Vital Signs Last 24 Hrs  T(C): 36 (12 Feb 2023 11:02), Max: 36.5 (11 Feb 2023 21:05)  T(F): 96.8 (12 Feb 2023 11:02), Max: 97.7 (11 Feb 2023 21:05)  HR: 113 (12 Feb 2023 11:02) (86 - 113)  BP: 107/47 (12 Feb 2023 11:02) (107/47 - 129/65)  BP(mean): --  RR: 18 (12 Feb 2023 11:02) (18 - 18)  SpO2: 92% (12 Feb 2023 11:02) (92% - 93%)    I&O's Summary    02-11-23 @ 07:01  -  02-12-23 @ 07:00  --------------------------------------------------------  IN: 56 mL / OUT: 0 mL / NET: 56 mL        PHYSICAL EXAM:  GENERAL: NAD, well-developed, comfortable on room air  HEAD:  Atraumatic, Normocephalic  EYES: EOMI, PERRLA, conjunctiva and sclera clear  NECK: Supple, No JVD  CHEST/LUNG: mild decrease breath sounds bilaterally; No wheeze   HEART: Regular rate and rhythm; No murmurs, rubs, or gallops  ABDOMEN: Soft, Nontender, Nondistended; Bowel sounds present  Neuro: awake alert, back to baseline mental status. no focal weakness  EXTREMITIES:  2+ Peripheral Pulses, No clubbing, cyanosis, trace b/l edema  SKIN: superficial ecchymoses.     LABS:                        7.2    62.29 )-----------( 18       ( 12 Feb 2023 07:05 )             21.9     02-12    138  |  102  |  20  ----------------------------<  66<L>  3.3<L>   |  21<L>  |  0.83    Ca    8.8      12 Feb 2023 07:04    TPro  6.1  /  Alb  3.5  /  TBili  0.8  /  DBili  x   /  AST  15  /  ALT  6<L>  /  AlkPhos  78  02-12      CAPILLARY BLOOD GLUCOSE      POCT Blood Glucose.: 109 mg/dL (12 Feb 2023 10:50)            RADIOLOGY & ADDITIONAL TESTS:    Imaging Personally Reviewed:  [x] YES  [ ] NO    Case discussed with NPP:  [X] YES  [ ] NO                                   Primary Care Physician: Davidson Castillo DO  Date of Visit: 06/05/2024 10:00 AM EDT  Location of visit: [facility] [address]     Chief Complaint:   Follow up afib       HPI / Summary:   Dandre Carroll is a 63 -year-old male with history of of paroxysmal A. fib and tachycardia induced cardiomyopathy in 2017 with cardioversion at that time and normalization of LVEF about 5 months later, Return with persistent A-fib status post repeat cardioversion 4/25/2024 and placed on flecainide     He saw EP in consultation and wanted to try antiarrhythmic first.  Was loaded with flecainide and underwent cardioversion.  However, back in A-fib with controlled rate today.  He does report some intermittent flutter/palpitation although overall does not report significant impact on quality of life.    ECG 6/5/2024: A-fib with HR 92 bpm      ECG 2/26/2024: A-fib with RVR  bpm      ECG 5/2022: Normal sinus rhythm          ECHO 3/8/24: LVEF 55-60%, LA moderately dilated, A-fib, trivial AI    TTE 2/2018 - LVEF 60%, aortic regurg not assessed  TTE 2017 - LVEF 30%, mild to mod AI    MONITOR 3/8/24  CARDIOVERSION 4/25/24  Patient successfully cardioverted from Atrial fibrillation to Sinus rhythm     famhx: father with DM, heart issues aortic aneurysm with surgery  sochx: nonsmoker, ETOH 2-3 beers a day         Past Medical History:  Past Medical History:   Diagnosis Date    Atrial fibrillation (Multi)     Hypertension     Personal history of other diseases of the circulatory system 01/28/2019    History of cardiomyopathy        Past Surgical History:  Past Surgical History:   Procedure Laterality Date    OTHER SURGICAL HISTORY  10/17/2019    Tonsillectomy    OTHER SURGICAL HISTORY  10/12/2021    Cataract surgery    OTHER SURGICAL HISTORY  10/12/2021    Colonoscopy          Social History:  He reports that he quit smoking about 38 years ago. His smoking use included cigarettes. He started smoking about 49 years ago. He has a 5.5 pack-year  "smoking history. He has never used smokeless tobacco. He reports current alcohol use of about 14.0 standard drinks of alcohol per week. He reports that he does not use drugs.    Family History:  family history includes Aortic aneurysm in his father.      Allergies:  Allergies   Allergen Reactions    Bee Venom Protein (Honey Bee) Swelling    Cat Dander Itching     Sinus congestion       Outpatient Medications:  Current Outpatient Medications   Medication Instructions    acetaminophen (TYLENOL) 325-650 mg, oral, Every 4 hours PRN    apixaban (ELIQUIS) 5 mg, oral, 2 times daily    carvedilol (COREG) 6.25 mg, oral, 2 times daily (morning and late afternoon)    flecainide (TAMBOCOR) 100 mg, oral, 2 times daily    folic acid-vit B6-vit B12 (Folbic) 2.5-25-2 mg tablet 1 tablet, oral, Daily    furosemide (LASIX) 20 mg, oral, Daily    glucosamine-chondroitin 250-200 mg tablet oral    lisinopril 10 mg, oral, Daily, as directed    magnesium glycinate 100 mg magnesium capsule oral, Daily    sildenafil (Viagra) 25 mg tablet oral    thiamine 100 mg tablet 1 tablet, oral, Daily       Physical Exam:  GENERAL: alert, cooperative, pleasant, in no acute distress  SKIN: warm, dry, no rash.  Probably benign nevi on his back that is dark and circumferential.  He is going to follow-up with PCP about this.  NECK: no JVD, no ZAID  CARDIAC: Regular rate and rhythm with no rubs, murmurs, or gallops  CHEST: Normal respiratory efforts, lungs clear to auscultation bilaterally.  ABDOMEN: soft, nontender, nondistended  EXTREMITIES: no edema  NEURO: Alert and oriented x 3.  Grossly normal.  Moves all 4 extremities.    Vitals:    06/05/24 0951   BP: 128/85   BP Location: Left arm   Patient Position: Sitting   Pulse: 91   SpO2: 96%   Weight: 76.7 kg (169 lb 1.6 oz)   Height: 1.778 m (5' 10\")       Wt Readings from Last 5 Encounters:   04/25/24 74.8 kg (165 lb)   04/23/24 76.6 kg (168 lb 14.4 oz)   02/26/24 75.3 kg (166 lb)   02/13/23 70.3 kg (155 lb) "   05/16/22 68 kg (150 lb)     Body mass index is 24.26 kg/m².        Last Labs:  CMP:  Recent Labs     03/14/24  0855 10/13/21  0856 11/04/19  0823   * 132* 131*   K 5.0 4.9 4.6   CL 98 99 97*   CO2 29 24 26   ANIONGAP 12 14 13   BUN 14 10 8   CREATININE 0.94 0.77 0.70   EGFR >90  --   --    GLUCOSE 88 90 82     Recent Labs     03/14/24  0855 10/13/21  0856 11/04/19  0823 10/22/19  0836   ALBUMIN 4.5 4.4 4.2 4.5   ALKPHOS 51 65  --  48   ALT 12 13  --  11   AST 16 21  --  18   BILITOT 0.9 0.8  --  0.8     CBC:  Recent Labs     03/14/24  0855 10/13/21  0856 11/04/19  0823   WBC 5.0 4.8 6.5   HGB 13.9 12.1* 13.0*   HCT 42.0 37.3* 39.3*    249 258   MCV 92 93 93     COAG:   Recent Labs     10/23/17  1617 10/22/17  0940   INR 1.8* 1.4*     ENDO:  Recent Labs     03/14/24  0855 10/13/21  0856 11/04/19  0921 10/23/17  0530   TSH 1.50  --  1.31 7.77*   HGBA1C  --  5.2  --  6.0      CARDIAC:   Recent Labs     10/13/21  0856 10/22/17  0940     --    BNP  --  886*     Recent Labs     03/14/24  0855 10/13/21  0856 10/22/19  0836   CHOL 205* 196 190   LDLF  --  107* 93   LDLCALC 104*  --   --    HDL 85.2 76.8 81.9   TRIG 79 62 76     No data recorded          Assessment/Plan   63-year-old male with history of of paroxysmal A. fib and tachycardia induced cardiomyopathy in 2017 with cardioversion at that time and normalization of LVEF a few months later, recurrent persistent A-fib status post cardioversion 4/25/2024    Today he is back in atrial fibrillation with controlled rate.  Minimal symptoms but does feel some palpitations does have chronic edema.  We reviewed options again today including antiarrhythmic versus ablation.  Antiarrhythmic choices would need to be class III agents and probably hospitalization for loading with either sotalol or dofetilide.  Amiodarone not as good option at this point due to his young age.  He is leaning towards ablation.  Would like to take some time to think and discuss  with wife.  If he decides to proceed he will contact Dr. Reeves.  I can see again as needed.  Will stop flecainide.  Continue carvedilol, apixaban        Chronic HTN  -Controlled today  - Continue carvedilol, lisinopril and furosemide                Followup Appts:  Future Appointments   Date Time Provider Department Center   6/5/2024 10:00 AM Brady Cota DO [id number] [facility]             ____________________________________________________________  Brady Cota DO  [facility]  [facility]

## 2024-06-05 ENCOUNTER — OFFICE VISIT (OUTPATIENT)
Dept: CARDIOLOGY | Facility: CLINIC | Age: 64
End: 2024-06-05
Payer: COMMERCIAL

## 2024-06-05 ENCOUNTER — APPOINTMENT (OUTPATIENT)
Dept: CARDIOLOGY | Facility: CLINIC | Age: 64
End: 2024-06-05
Payer: COMMERCIAL

## 2024-06-05 VITALS
OXYGEN SATURATION: 96 % | DIASTOLIC BLOOD PRESSURE: 85 MMHG | SYSTOLIC BLOOD PRESSURE: 128 MMHG | HEIGHT: 70 IN | BODY MASS INDEX: 24.21 KG/M2 | HEART RATE: 91 BPM | WEIGHT: 169.1 LBS

## 2024-06-05 DIAGNOSIS — I48.19 PERSISTENT ATRIAL FIBRILLATION (MULTI): Primary | ICD-10-CM

## 2024-06-05 DIAGNOSIS — I10 CHRONIC HYPERTENSION: ICD-10-CM

## 2024-06-05 DIAGNOSIS — R00.0 TACHYCARDIA, UNSPECIFIED: ICD-10-CM

## 2024-06-05 PROBLEM — R16.0 LIVER MASS, RIGHT LOBE: Status: ACTIVE | Noted: 2024-06-05

## 2024-06-05 PROBLEM — H33.002 MACULA-ON RHEGMATOGENOUS RETINAL DETACHMENT OF LEFT EYE: Status: ACTIVE | Noted: 2024-06-05

## 2024-06-05 PROBLEM — D64.9 NORMOCYTIC ANEMIA: Status: ACTIVE | Noted: 2024-06-05

## 2024-06-05 PROBLEM — I43 TACHYCARDIA-INDUCED CARDIOMYOPATHY (MULTI): Status: ACTIVE | Noted: 2024-06-05

## 2024-06-05 PROBLEM — I48.0 PAROXYSMAL ATRIAL FIBRILLATION (MULTI): Status: ACTIVE | Noted: 2024-02-26

## 2024-06-05 PROBLEM — N52.9 ERECTILE DYSFUNCTION: Status: ACTIVE | Noted: 2024-06-05

## 2024-06-05 PROBLEM — I42.8 TACHYCARDIA-INDUCED CARDIOMYOPATHY: Status: ACTIVE | Noted: 2024-06-05

## 2024-06-05 LAB
ATRIAL RATE: 104 BPM
Q ONSET: 216 MS
QRS COUNT: 15 BEATS
QRS DURATION: 104 MS
QT INTERVAL: 394 MS
QTC CALCULATION(BAZETT): 487 MS
QTC FREDERICIA: 454 MS
R AXIS: 82 DEGREES
T AXIS: 71 DEGREES
T OFFSET: 413 MS
VENTRICULAR RATE: 92 BPM

## 2024-06-05 PROCEDURE — 99214 OFFICE O/P EST MOD 30 MIN: CPT | Performed by: INTERNAL MEDICINE

## 2024-06-05 PROCEDURE — 3079F DIAST BP 80-89 MM HG: CPT | Performed by: INTERNAL MEDICINE

## 2024-06-05 PROCEDURE — 93005 ELECTROCARDIOGRAM TRACING: CPT | Performed by: INTERNAL MEDICINE

## 2024-06-05 PROCEDURE — 3074F SYST BP LT 130 MM HG: CPT | Performed by: INTERNAL MEDICINE

## 2024-06-05 PROCEDURE — 1036F TOBACCO NON-USER: CPT | Performed by: INTERNAL MEDICINE

## 2024-06-05 RX ORDER — CARVEDILOL 6.25 MG/1
6.25 TABLET ORAL
Qty: 180 TABLET | Refills: 3 | Status: SHIPPED | OUTPATIENT
Start: 2024-06-05

## 2024-06-05 ASSESSMENT — PAIN SCALES - GENERAL: PAINLEVEL: 0-NO PAIN

## 2024-06-30 DIAGNOSIS — I48.0 PAROXYSMAL ATRIAL FIBRILLATION (MULTI): ICD-10-CM

## 2024-07-01 RX ORDER — LISINOPRIL 10 MG/1
10 TABLET ORAL DAILY
Qty: 90 TABLET | Refills: 3 | Status: SHIPPED | OUTPATIENT
Start: 2024-07-01

## 2024-08-09 DIAGNOSIS — I48.0 PAROXYSMAL ATRIAL FIBRILLATION (MULTI): ICD-10-CM

## 2024-08-12 RX ORDER — FUROSEMIDE 20 MG/1
TABLET ORAL
Qty: 90 TABLET | Refills: 0 | Status: SHIPPED | OUTPATIENT
Start: 2024-08-12

## 2024-11-08 DIAGNOSIS — I48.0 PAROXYSMAL ATRIAL FIBRILLATION (MULTI): ICD-10-CM

## 2024-11-08 RX ORDER — FUROSEMIDE 20 MG/1
TABLET ORAL
Qty: 90 TABLET | Refills: 0 | Status: SHIPPED | OUTPATIENT
Start: 2024-11-08

## 2024-12-17 ENCOUNTER — HOSPITAL ENCOUNTER (OUTPATIENT)
Dept: CARDIOLOGY | Facility: CLINIC | Age: 64
Discharge: HOME | End: 2024-12-17
Payer: COMMERCIAL

## 2024-12-17 ENCOUNTER — OFFICE VISIT (OUTPATIENT)
Dept: CARDIOLOGY | Facility: CLINIC | Age: 64
End: 2024-12-17
Payer: COMMERCIAL

## 2024-12-17 VITALS
WEIGHT: 176.9 LBS | SYSTOLIC BLOOD PRESSURE: 143 MMHG | OXYGEN SATURATION: 99 % | DIASTOLIC BLOOD PRESSURE: 89 MMHG | HEART RATE: 113 BPM | BODY MASS INDEX: 25.33 KG/M2 | HEIGHT: 70 IN

## 2024-12-17 DIAGNOSIS — Z01.818 PREOP TESTING: ICD-10-CM

## 2024-12-17 PROCEDURE — 3077F SYST BP >= 140 MM HG: CPT | Performed by: INTERNAL MEDICINE

## 2024-12-17 PROCEDURE — 99214 OFFICE O/P EST MOD 30 MIN: CPT | Performed by: INTERNAL MEDICINE

## 2024-12-17 PROCEDURE — 3079F DIAST BP 80-89 MM HG: CPT | Performed by: INTERNAL MEDICINE

## 2024-12-17 PROCEDURE — 93005 ELECTROCARDIOGRAM TRACING: CPT

## 2024-12-17 PROCEDURE — 1036F TOBACCO NON-USER: CPT | Performed by: INTERNAL MEDICINE

## 2024-12-17 PROCEDURE — 3008F BODY MASS INDEX DOCD: CPT | Performed by: INTERNAL MEDICINE

## 2024-12-17 ASSESSMENT — PAIN SCALES - GENERAL: PAINLEVEL_OUTOF10: 0-NO PAIN

## 2024-12-17 ASSESSMENT — ENCOUNTER SYMPTOMS
DEPRESSION: 0
OCCASIONAL FEELINGS OF UNSTEADINESS: 0
LOSS OF SENSATION IN FEET: 0

## 2024-12-17 ASSESSMENT — PATIENT HEALTH QUESTIONNAIRE - PHQ9
2. FEELING DOWN, DEPRESSED OR HOPELESS: NOT AT ALL
SUM OF ALL RESPONSES TO PHQ9 QUESTIONS 1 AND 2: 0
1. LITTLE INTEREST OR PLEASURE IN DOING THINGS: NOT AT ALL

## 2024-12-17 NOTE — PROGRESS NOTES
Referred by Dr. Hernandez ref. provider found provider found for   Chief Complaint   Patient presents with    Atrial Fibrillation        Dandre Carroll is a 64 y.o. year old male patient with h/o paroxysmal A. fib and tachycardia induced cardiomyopathy in 2017 with cardioversion at that time and normalization of LVEF. Unfortunately the patient is back in NSR and as discussed in the past he is willing to proceed with RF ablation. Presents for evaluation.     PMHx/PSHx: As above    FamHx: unremarkable     Allergies:  Allergies   Allergen Reactions    Bee Venom Protein (Honey Bee) Swelling    Cat Dander Itching     Sinus congestion        Review of Systems    Constitutional: not feeling tired.   Eyes: no eyesight problems.   ENT: no hearing loss and no nosebleeds.   Cardiovascular: no intermittent leg claudication and as noted in HPI.   Respiratory: no chronic cough and no shortness of breath.   Gastrointestinal: no change in bowel habits and no blood in stools.   Genitourinary: no urinary frequency and no hematuria.   Skin: no skin rashes.   Neurological: no seizures and no frequent falls.   Psychiatric: no depression and not suicidal.   All other systems have been reviewed and are negative for complaint.     Outpatient Medications:  Current Outpatient Medications   Medication Instructions    acetaminophen (TYLENOL) 325-650 mg, Every 4 hours PRN    apixaban (ELIQUIS) 5 mg, oral, 2 times daily    carvedilol (COREG) 6.25 mg, oral, 2 times daily (morning and late afternoon)    folic acid-vit B6-vit B12 (Folbic) 2.5-25-2 mg tablet 1 tablet, Daily    furosemide (Lasix) 20 mg tablet TAKE 1 TABLET BY MOUTH DAILY AS DIRECTED AS NEEDED FOR WEIGHT GAIN    glucosamine-chondroitin 250-200 mg tablet Take by mouth.    lisinopril 10 mg, oral, Daily, as directed    magnesium glycinate 100 mg magnesium capsule Daily    thiamine 100 mg tablet 1 tablet, Daily         Last Recorded Vitals:      4/25/2024    12:45 PM 4/25/2024     1:12 PM  "4/25/2024     1:14 PM 4/25/2024     1:25 PM 4/25/2024     1:56 PM 6/5/2024     9:51 AM 12/17/2024     1:16 PM   Vitals   Systolic 155 134 137 133 133 128 143   Diastolic 96 81 86 81 79 85 89   BP Location      Left arm Right arm   Heart Rate 110 79 78 78 78 91 113   Temp 36.9 °C (98.4 °F)         Resp 18 13 13 11 10     Height 1.778 m (5' 10\")     1.778 m (5' 10\") 1.765 m (5' 9.5\")   Weight (lb) 165     169.1 176.9   BMI 23.68 kg/m2     24.26 kg/m2 25.75 kg/m2   BSA (m2) 1.92 m2     1.95 m2 1.98 m2   Visit Report      Report Report    Visit Vitals  /89 (BP Location: Right arm, Patient Position: Sitting, BP Cuff Size: Adult long)   Pulse (!) 113   Ht 1.765 m (5' 9.5\")   Wt 80.2 kg (176 lb 14.4 oz)   SpO2 99%   BMI 25.75 kg/m²   Smoking Status Former   BSA 1.98 m²        Physical Exam:  Constitutional: alert and in no acute distress.   Eyes: no erythema, swelling or discharge from the eye .   Neck: neck is supple, symmetric, trachea midline, no masses  and no thyromegaly .   Pulmonary: no increased work of breathing or signs of respiratory distress  and lungs clear to auscultation.    Cardiovascular: carotid pulses 2+ bilaterally with no bruit , JVP was normal, no thrills , irregular rhythm, variable S1 and normal S2, no murmurs , pedal pulses 2+ bilaterally  and no edema .   Abdomen: abdomen non-tender, no masses  and no hepatomegaly .   Skin: skin warm and dry, normal skin turgor .   Psychiatric judgment and insight is normal  and oriented to person, place and time .        Assessment/Plan   Problem List Items Addressed This Visit    None  Visit Diagnoses         Codes    Preop testing     Z01.818    Relevant Orders    Basic Metabolic Panel    CBC            Dandre Carroll is a 64 y.o. year old male patient with h/o paroxysmal A. fib and tachycardia induced cardiomyopathy in 2017 with cardioversion at that time and normalization of LVEF. Unfortunately the patient is back in NSR and as discussed in the past he " is willing to proceed with RF ablation. Presents for evaluation.   His current ECG shows A fib with narrow QRS and HR of 113 bpm. I think he will benefit from A fib RFA. All the R/B/A of the procedure were discussed with the patient who expressed understanding and agrees to proceed.         Renato Reeves MD  Cardiac Electrophysiology      Thank you very much for allowing me to participate in the care of this pleasant patient. Please do not hesitate to contact me with any further questions or concerns regarding his care.    **Disclaimer: This note was dictated by speech recognition, and every effort has been made to prevent any error in transcription, however minor errors may be present**

## 2024-12-19 DIAGNOSIS — I48.19 PERSISTENT ATRIAL FIBRILLATION (MULTI): ICD-10-CM

## 2024-12-22 LAB
ATRIAL RATE: 110 BPM
Q ONSET: 220 MS
QRS COUNT: 19 BEATS
QRS DURATION: 90 MS
QT INTERVAL: 348 MS
QTC CALCULATION(BAZETT): 477 MS
QTC FREDERICIA: 429 MS
R AXIS: 70 DEGREES
T AXIS: 48 DEGREES
T OFFSET: 394 MS
VENTRICULAR RATE: 113 BPM

## 2025-02-07 LAB
ANION GAP SERPL CALCULATED.4IONS-SCNC: 11 MMOL/L (CALC) (ref 7–17)
BUN SERPL-MCNC: 13 MG/DL (ref 7–25)
BUN/CREAT SERPL: NORMAL (CALC) (ref 6–22)
CALCIUM SERPL-MCNC: 9.8 MG/DL (ref 8.6–10.3)
CHLORIDE SERPL-SCNC: 98 MMOL/L (ref 98–110)
CO2 SERPL-SCNC: 26 MMOL/L (ref 20–32)
CREAT SERPL-MCNC: 0.9 MG/DL (ref 0.7–1.35)
EGFRCR SERPLBLD CKD-EPI 2021: 95 ML/MIN/1.73M2
ERYTHROCYTE [DISTWIDTH] IN BLOOD BY AUTOMATED COUNT: 12 % (ref 11–15)
GLUCOSE SERPL-MCNC: 89 MG/DL (ref 65–99)
HCT VFR BLD AUTO: 45.3 % (ref 38.5–50)
HGB BLD-MCNC: 15 G/DL (ref 13.2–17.1)
MCH RBC QN AUTO: 30.9 PG (ref 27–33)
MCHC RBC AUTO-ENTMCNC: 33.1 G/DL (ref 32–36)
MCV RBC AUTO: 93.4 FL (ref 80–100)
PLATELET # BLD AUTO: 281 THOUSAND/UL (ref 140–400)
PMV BLD REES-ECKER: 9.2 FL (ref 7.5–12.5)
POTASSIUM SERPL-SCNC: 4.9 MMOL/L (ref 3.5–5.3)
RBC # BLD AUTO: 4.85 MILLION/UL (ref 4.2–5.8)
SODIUM SERPL-SCNC: 135 MMOL/L (ref 135–146)
WBC # BLD AUTO: 6.6 THOUSAND/UL (ref 3.8–10.8)

## 2025-02-08 DIAGNOSIS — I48.0 PAROXYSMAL ATRIAL FIBRILLATION (MULTI): ICD-10-CM

## 2025-02-10 RX ORDER — FUROSEMIDE 20 MG/1
TABLET ORAL
Qty: 90 TABLET | Refills: 1 | Status: SHIPPED | OUTPATIENT
Start: 2025-02-10

## 2025-02-10 NOTE — TELEPHONE ENCOUNTER
LOV 6/2024 note reviewed. Scheduled for AF ablation this week with Dr. Reeves. Script cued and forwarded for signature

## 2025-02-12 ENCOUNTER — ANESTHESIA EVENT (OUTPATIENT)
Dept: CARDIOLOGY | Facility: HOSPITAL | Age: 65
End: 2025-02-12
Payer: COMMERCIAL

## 2025-02-13 ENCOUNTER — HOSPITAL ENCOUNTER (OUTPATIENT)
Dept: CARDIOLOGY | Facility: HOSPITAL | Age: 65
Discharge: HOME | End: 2025-02-13
Payer: COMMERCIAL

## 2025-02-13 ENCOUNTER — ANESTHESIA (OUTPATIENT)
Dept: CARDIOLOGY | Facility: HOSPITAL | Age: 65
End: 2025-02-13
Payer: COMMERCIAL

## 2025-02-13 ENCOUNTER — HOSPITAL ENCOUNTER (OUTPATIENT)
Facility: HOSPITAL | Age: 65
Setting detail: OUTPATIENT SURGERY
Discharge: HOME | End: 2025-02-13
Attending: INTERNAL MEDICINE | Admitting: INTERNAL MEDICINE
Payer: COMMERCIAL

## 2025-02-13 ENCOUNTER — PHARMACY VISIT (OUTPATIENT)
Dept: PHARMACY | Facility: CLINIC | Age: 65
End: 2025-02-13
Payer: MEDICARE

## 2025-02-13 VITALS
DIASTOLIC BLOOD PRESSURE: 72 MMHG | SYSTOLIC BLOOD PRESSURE: 129 MMHG | RESPIRATION RATE: 17 BRPM | TEMPERATURE: 97.7 F | HEART RATE: 92 BPM | OXYGEN SATURATION: 99 %

## 2025-02-13 DIAGNOSIS — I48.0 PAROXYSMAL ATRIAL FIBRILLATION (MULTI): Primary | ICD-10-CM

## 2025-02-13 DIAGNOSIS — Z98.890 STATUS POST ABLATION OF ATRIAL FIBRILLATION: ICD-10-CM

## 2025-02-13 DIAGNOSIS — Z86.79 STATUS POST ABLATION OF ATRIAL FIBRILLATION: ICD-10-CM

## 2025-02-13 LAB
ACT BLD: 293 SEC (ref 82–174)
ACT BLD: 332 SEC (ref 82–174)
ACT BLD: 394 SEC (ref 82–174)

## 2025-02-13 PROCEDURE — 3700000001 HC GENERAL ANESTHESIA TIME - INITIAL BASE CHARGE: Performed by: INTERNAL MEDICINE

## 2025-02-13 PROCEDURE — 93662 INTRACARDIAC ECG (ICE): CPT | Performed by: INTERNAL MEDICINE

## 2025-02-13 PROCEDURE — 7100000002 HC RECOVERY ROOM TIME - EACH INCREMENTAL 1 MINUTE: Performed by: INTERNAL MEDICINE

## 2025-02-13 PROCEDURE — 92960 CARDIOVERSION ELECTRIC EXT: CPT | Performed by: INTERNAL MEDICINE

## 2025-02-13 PROCEDURE — C1731 CATH, EP, 20 OR MORE ELEC: HCPCS | Performed by: INTERNAL MEDICINE

## 2025-02-13 PROCEDURE — 2550000001 HC RX 255 CONTRASTS: Performed by: INTERNAL MEDICINE

## 2025-02-13 PROCEDURE — 93621 COMP EP EVL L PAC&REC C SINS: CPT | Performed by: INTERNAL MEDICINE

## 2025-02-13 PROCEDURE — 7100000010 HC PHASE TWO TIME - EACH INCREMENTAL 1 MINUTE: Performed by: INTERNAL MEDICINE

## 2025-02-13 PROCEDURE — 93656 COMPRE EP EVAL ABLTJ ATR FIB: CPT | Performed by: INTERNAL MEDICINE

## 2025-02-13 PROCEDURE — 2500000001 HC RX 250 WO HCPCS SELF ADMINISTERED DRUGS (ALT 637 FOR MEDICARE OP): Performed by: NURSE PRACTITIONER

## 2025-02-13 PROCEDURE — RXMED WILLOW AMBULATORY MEDICATION CHARGE

## 2025-02-13 PROCEDURE — 93005 ELECTROCARDIOGRAM TRACING: CPT

## 2025-02-13 PROCEDURE — 2500000005 HC RX 250 GENERAL PHARMACY W/O HCPCS

## 2025-02-13 PROCEDURE — 2720000007 HC OR 272 NO HCPCS: Performed by: INTERNAL MEDICINE

## 2025-02-13 PROCEDURE — 7100000009 HC PHASE TWO TIME - INITIAL BASE CHARGE: Performed by: INTERNAL MEDICINE

## 2025-02-13 PROCEDURE — 2500000004 HC RX 250 GENERAL PHARMACY W/ HCPCS (ALT 636 FOR OP/ED): Performed by: INTERNAL MEDICINE

## 2025-02-13 PROCEDURE — 93010 ELECTROCARDIOGRAM REPORT: CPT | Performed by: INTERNAL MEDICINE

## 2025-02-13 PROCEDURE — C1730 CATH, EP, 19 OR FEW ELECT: HCPCS | Performed by: INTERNAL MEDICINE

## 2025-02-13 PROCEDURE — 2500000004 HC RX 250 GENERAL PHARMACY W/ HCPCS (ALT 636 FOR OP/ED)

## 2025-02-13 PROCEDURE — C1893 INTRO/SHEATH, FIXED,NON-PEEL: HCPCS | Performed by: INTERNAL MEDICINE

## 2025-02-13 PROCEDURE — 85347 COAGULATION TIME ACTIVATED: CPT | Mod: 91

## 2025-02-13 PROCEDURE — C1732 CATH, EP, DIAG/ABL, 3D/VECT: HCPCS | Performed by: INTERNAL MEDICINE

## 2025-02-13 PROCEDURE — C1759 CATH, INTRA ECHOCARDIOGRAPHY: HCPCS | Performed by: INTERNAL MEDICINE

## 2025-02-13 PROCEDURE — 99222 1ST HOSP IP/OBS MODERATE 55: CPT | Performed by: NURSE PRACTITIONER

## 2025-02-13 PROCEDURE — 3700000002 HC GENERAL ANESTHESIA TIME - EACH INCREMENTAL 1 MINUTE: Performed by: INTERNAL MEDICINE

## 2025-02-13 PROCEDURE — 7100000001 HC RECOVERY ROOM TIME - INITIAL BASE CHARGE: Performed by: INTERNAL MEDICINE

## 2025-02-13 RX ORDER — ROCURONIUM BROMIDE 10 MG/ML
INJECTION, SOLUTION INTRAVENOUS AS NEEDED
Status: DISCONTINUED | OUTPATIENT
Start: 2025-02-13 | End: 2025-02-13

## 2025-02-13 RX ORDER — AMIODARONE HYDROCHLORIDE 200 MG/1
TABLET ORAL
Qty: 132 TABLET | Refills: 0 | Status: SHIPPED | OUTPATIENT
Start: 2025-02-13 | End: 2025-05-28

## 2025-02-13 RX ORDER — ADENOSINE 3 MG/ML
12 INJECTION, SOLUTION INTRAVENOUS ONCE AS NEEDED
Status: DISCONTINUED | OUTPATIENT
Start: 2025-02-13 | End: 2025-02-13 | Stop reason: HOSPADM

## 2025-02-13 RX ORDER — FENTANYL CITRATE 50 UG/ML
INJECTION, SOLUTION INTRAMUSCULAR; INTRAVENOUS AS NEEDED
Status: DISCONTINUED | OUTPATIENT
Start: 2025-02-13 | End: 2025-02-13

## 2025-02-13 RX ORDER — ONDANSETRON HYDROCHLORIDE 2 MG/ML
INJECTION, SOLUTION INTRAVENOUS AS NEEDED
Status: DISCONTINUED | OUTPATIENT
Start: 2025-02-13 | End: 2025-02-13

## 2025-02-13 RX ORDER — HEPARIN SODIUM 10000 [USP'U]/100ML
INJECTION, SOLUTION INTRAVENOUS CONTINUOUS PRN
Status: DISCONTINUED | OUTPATIENT
Start: 2025-02-13 | End: 2025-02-13

## 2025-02-13 RX ORDER — OXYCODONE HYDROCHLORIDE 5 MG/1
5 TABLET ORAL EVERY 4 HOURS PRN
Status: DISCONTINUED | OUTPATIENT
Start: 2025-02-13 | End: 2025-02-13 | Stop reason: HOSPADM

## 2025-02-13 RX ORDER — LIDOCAINE HYDROCHLORIDE 10 MG/ML
INJECTION, SOLUTION EPIDURAL; INFILTRATION; INTRACAUDAL; PERINEURAL AS NEEDED
Status: DISCONTINUED | OUTPATIENT
Start: 2025-02-13 | End: 2025-02-13 | Stop reason: HOSPADM

## 2025-02-13 RX ORDER — METOPROLOL SUCCINATE 25 MG/1
50 TABLET, EXTENDED RELEASE ORAL ONCE
Status: COMPLETED | OUTPATIENT
Start: 2025-02-13 | End: 2025-02-13

## 2025-02-13 RX ORDER — PHENYLEPHRINE HCL IN 0.9% NACL 1 MG/10 ML
SYRINGE (ML) INTRAVENOUS AS NEEDED
Status: DISCONTINUED | OUTPATIENT
Start: 2025-02-13 | End: 2025-02-13

## 2025-02-13 RX ORDER — ACETAMINOPHEN 325 MG/1
650 TABLET ORAL EVERY 4 HOURS PRN
Status: DISCONTINUED | OUTPATIENT
Start: 2025-02-13 | End: 2025-02-13 | Stop reason: HOSPADM

## 2025-02-13 RX ORDER — METOCLOPRAMIDE HYDROCHLORIDE 5 MG/ML
10 INJECTION INTRAMUSCULAR; INTRAVENOUS ONCE AS NEEDED
Status: DISCONTINUED | OUTPATIENT
Start: 2025-02-13 | End: 2025-02-13 | Stop reason: HOSPADM

## 2025-02-13 RX ORDER — DEXAMETHASONE SODIUM PHOSPHATE 10 MG/ML
INJECTION INTRAMUSCULAR; INTRAVENOUS AS NEEDED
Status: DISCONTINUED | OUTPATIENT
Start: 2025-02-13 | End: 2025-02-13

## 2025-02-13 RX ORDER — HEPARIN SODIUM 1000 [USP'U]/ML
INJECTION, SOLUTION INTRAVENOUS; SUBCUTANEOUS AS NEEDED
Status: DISCONTINUED | OUTPATIENT
Start: 2025-02-13 | End: 2025-02-13

## 2025-02-13 RX ORDER — METOPROLOL SUCCINATE 50 MG/1
50 TABLET, EXTENDED RELEASE ORAL DAILY
Qty: 30 TABLET | Refills: 3 | Status: SHIPPED | OUTPATIENT
Start: 2025-02-14

## 2025-02-13 RX ORDER — MIDAZOLAM HYDROCHLORIDE 1 MG/ML
INJECTION INTRAMUSCULAR; INTRAVENOUS AS NEEDED
Status: DISCONTINUED | OUTPATIENT
Start: 2025-02-13 | End: 2025-02-13

## 2025-02-13 RX ORDER — LIDOCAINE HYDROCHLORIDE 10 MG/ML
0.1 INJECTION, SOLUTION INFILTRATION; PERINEURAL ONCE
Status: DISCONTINUED | OUTPATIENT
Start: 2025-02-13 | End: 2025-02-13 | Stop reason: HOSPADM

## 2025-02-13 RX ORDER — SODIUM CHLORIDE, SODIUM LACTATE, POTASSIUM CHLORIDE, CALCIUM CHLORIDE 600; 310; 30; 20 MG/100ML; MG/100ML; MG/100ML; MG/100ML
100 INJECTION, SOLUTION INTRAVENOUS CONTINUOUS
Status: ACTIVE | OUTPATIENT
Start: 2025-02-13 | End: 2025-02-13

## 2025-02-13 RX ORDER — PROPOFOL 10 MG/ML
INJECTION, EMULSION INTRAVENOUS AS NEEDED
Status: DISCONTINUED | OUTPATIENT
Start: 2025-02-13 | End: 2025-02-13

## 2025-02-13 RX ORDER — PANTOPRAZOLE SODIUM 40 MG/1
40 TABLET, DELAYED RELEASE ORAL DAILY
Qty: 30 TABLET | Refills: 0 | Status: SHIPPED | OUTPATIENT
Start: 2025-02-13 | End: 2025-03-15

## 2025-02-13 RX ORDER — ADENOSINE 3 MG/ML
6 INJECTION, SOLUTION INTRAVENOUS ONCE
Status: DISCONTINUED | OUTPATIENT
Start: 2025-02-13 | End: 2025-02-13 | Stop reason: HOSPADM

## 2025-02-13 RX ORDER — LIDOCAINE HYDROCHLORIDE 20 MG/ML
INJECTION, SOLUTION INFILTRATION; PERINEURAL AS NEEDED
Status: DISCONTINUED | OUTPATIENT
Start: 2025-02-13 | End: 2025-02-13

## 2025-02-13 RX ORDER — PHENYLEPHRINE 10 MG/250 ML(40 MCG/ML)IN 0.9 % SOD.CHLORIDE INTRAVENOUS
CONTINUOUS PRN
Status: DISCONTINUED | OUTPATIENT
Start: 2025-02-13 | End: 2025-02-13

## 2025-02-13 RX ORDER — PROTAMINE SULFATE 10 MG/ML
INJECTION, SOLUTION INTRAVENOUS AS NEEDED
Status: DISCONTINUED | OUTPATIENT
Start: 2025-02-13 | End: 2025-02-13

## 2025-02-13 RX ORDER — NORETHINDRONE AND ETHINYL ESTRADIOL 0.5-0.035
KIT ORAL AS NEEDED
Status: DISCONTINUED | OUTPATIENT
Start: 2025-02-13 | End: 2025-02-13

## 2025-02-13 RX ADMIN — Medication 300 MCG: at 08:44

## 2025-02-13 RX ADMIN — SODIUM CHLORIDE: 9 INJECTION, SOLUTION INTRAVENOUS at 08:24

## 2025-02-13 RX ADMIN — ROCURONIUM BROMIDE 70 MG: 10 INJECTION, SOLUTION INTRAVENOUS at 08:30

## 2025-02-13 RX ADMIN — DEXAMETHASONE SODIUM PHOSPHATE 5 MG: 10 INJECTION, SOLUTION INTRAMUSCULAR; INTRAVENOUS at 08:45

## 2025-02-13 RX ADMIN — Medication 100 MCG: at 09:22

## 2025-02-13 RX ADMIN — LIDOCAINE HYDROCHLORIDE 100 MG: 20 INJECTION, SOLUTION INFILTRATION; PERINEURAL at 08:30

## 2025-02-13 RX ADMIN — ONDANSETRON 4 MG: 2 INJECTION, SOLUTION INTRAMUSCULAR; INTRAVENOUS at 10:21

## 2025-02-13 RX ADMIN — HEPARIN SODIUM 5000 UNITS: 1000 INJECTION INTRAVENOUS; SUBCUTANEOUS at 09:57

## 2025-02-13 RX ADMIN — HEPARIN SODIUM AND DEXTROSE 13.75 UNITS/KG/HR: 10000; 5 INJECTION INTRAVENOUS at 09:28

## 2025-02-13 RX ADMIN — FENTANYL CITRATE 100 MCG: 50 INJECTION, SOLUTION INTRAMUSCULAR; INTRAVENOUS at 08:30

## 2025-02-13 RX ADMIN — PROPOFOL 200 MG: 10 INJECTION, EMULSION INTRAVENOUS at 08:30

## 2025-02-13 RX ADMIN — METOPROLOL SUCCINATE 50 MG: 25 TABLET, EXTENDED RELEASE ORAL at 15:22

## 2025-02-13 RX ADMIN — Medication 200 MCG: at 08:53

## 2025-02-13 RX ADMIN — PROTAMINE SULFATE 40 MG: 10 INJECTION, SOLUTION INTRAVENOUS at 10:21

## 2025-02-13 RX ADMIN — PHENYLEPHRINE-NACL IV SOLUTION 10 MG/250ML-0.9% 0.5 MCG/KG/MIN: 10-0.9/25 SOLUTION at 09:05

## 2025-02-13 RX ADMIN — ROCURONIUM BROMIDE 10 MG: 10 INJECTION, SOLUTION INTRAVENOUS at 09:23

## 2025-02-13 RX ADMIN — Medication 200 MCG: at 09:06

## 2025-02-13 RX ADMIN — SUGAMMADEX 200 MG: 100 INJECTION, SOLUTION INTRAVENOUS at 10:25

## 2025-02-13 RX ADMIN — Medication 200 MCG: at 09:15

## 2025-02-13 RX ADMIN — MIDAZOLAM HYDROCHLORIDE 2 MG: 1 INJECTION, SOLUTION INTRAMUSCULAR; INTRAVENOUS at 08:24

## 2025-02-13 RX ADMIN — SODIUM CHLORIDE: 9 INJECTION, SOLUTION INTRAVENOUS at 08:50

## 2025-02-13 RX ADMIN — Medication 200 MCG: at 08:48

## 2025-02-13 RX ADMIN — HEPARIN SODIUM 10000 UNITS: 1000 INJECTION INTRAVENOUS; SUBCUTANEOUS at 09:28

## 2025-02-13 RX ADMIN — EPHEDRINE SULFATE 10 MG: 50 INJECTION, SOLUTION INTRAVENOUS at 09:38

## 2025-02-13 RX ADMIN — ROCURONIUM BROMIDE 10 MG: 10 INJECTION, SOLUTION INTRAVENOUS at 10:01

## 2025-02-13 SDOH — HEALTH STABILITY: MENTAL HEALTH: CURRENT SMOKER: 0

## 2025-02-13 ASSESSMENT — COLUMBIA-SUICIDE SEVERITY RATING SCALE - C-SSRS
1. IN THE PAST MONTH, HAVE YOU WISHED YOU WERE DEAD OR WISHED YOU COULD GO TO SLEEP AND NOT WAKE UP?: NO
6. HAVE YOU EVER DONE ANYTHING, STARTED TO DO ANYTHING, OR PREPARED TO DO ANYTHING TO END YOUR LIFE?: NO
2. HAVE YOU ACTUALLY HAD ANY THOUGHTS OF KILLING YOURSELF?: NO

## 2025-02-13 ASSESSMENT — ENCOUNTER SYMPTOMS
HEMATOLOGIC/LYMPHATIC NEGATIVE: 1
ALLERGIC/IMMUNOLOGIC NEGATIVE: 1
FATIGUE: 1
RESPIRATORY NEGATIVE: 1
NEUROLOGICAL NEGATIVE: 1
GASTROINTESTINAL NEGATIVE: 1
PSYCHIATRIC NEGATIVE: 1
PALPITATIONS: 1
MUSCULOSKELETAL NEGATIVE: 1
ENDOCRINE NEGATIVE: 1
EYES NEGATIVE: 1

## 2025-02-13 ASSESSMENT — PAIN SCALES - GENERAL

## 2025-02-13 ASSESSMENT — PAIN - FUNCTIONAL ASSESSMENT

## 2025-02-13 NOTE — PERIOPERATIVE NURSING NOTE
1317 A line removed at this time    1329 patient 's at this time, EKG performed, pamela patel contacted for interventions    1331 155 HR, pt alert asymptomatic, A&Ox3 denies and chest pain    1337 maykel patel at bedside, patient to received 500 ml NS boluse    1345 pamela patel at bedside for patient assessment at this time    1416 Spoke with Pamela Patel patient to received 500 mL NS bolus at this time, patient alert and asymptomatic     1419 wife updated on plan of care via phone call    1501  at bedside for assessment of patient, patient to receive 12mg of adenosine     1502 adenosine 12 mg given at this time    1511 wife at bedside at this time

## 2025-02-13 NOTE — SIGNIFICANT EVENT
Patient developed SVT this afternoon with -160s post ablation. BP stable. Asymptomatic with no c/o LH/dizziness/syncope. No c/o cp, sob. Patient attempted to bear down x2 with no response. Carotid massage done with no change in HR. Given total 1L IVF bolus. Discussed with Dr. Reeves and decision made to administer Adenosine.     Patient given 12 mg Adenosine IVP with conversion to SR/ST. Will start Metoprolol Succinate 50 mg daily and stop patient's home Carvediolol. Also to be discharged with PO Amiodarone load and PPI prophylaxis (all sent Meds to Beds) PACU RN advised to hold on removing figure of 8 bilateral femoral vein sutures for 0.5 hours. Then may remove per protocol. Will monitor patient for additional 1 hour following figure 8 suture removal prior to discharge. He will follow up with EP as scheduled in around 6 weeks.     Kaley Monge APRN-CNP  Interventional Lab/Cardiology   [facility]

## 2025-02-13 NOTE — H&P
History Of Present Illness  Dandre Carroll is a 64 y.o. male presenting with paroxysmal afib, here for RFA with Dr. Reeves. PMHx significant for pAF on Eliquis, tachycardia induced CM with recovered LVEF (LVEF 30% 2017>improvement to 60-65% post DCCV).     Last dose of Eliquis this past Tuesday.     Past Medical History:  Past Medical History:   Diagnosis Date    Atrial fibrillation (Multi)     Hypertension     Personal history of other diseases of the circulatory system 2019    History of cardiomyopathy        Past Surgical History:  Past Surgical History:   Procedure Laterality Date    OTHER SURGICAL HISTORY  10/17/2019    Tonsillectomy    OTHER SURGICAL HISTORY  10/12/2021    Cataract surgery    OTHER SURGICAL HISTORY  10/12/2021    Colonoscopy          Social History:  Social History     Tobacco Use    Smoking status: Former     Current packs/day: 0.00     Average packs/day: 0.5 packs/day for 11.0 years (5.5 ttl pk-yrs)     Types: Cigarettes     Start date:      Quit date:      Years since quittin.1    Smokeless tobacco: Never   Substance Use Topics    Alcohol use: Yes     Alcohol/week: 14.0 standard drinks of alcohol     Types: 14 Cans of beer per week    Drug use: Never       Family History:  Family History   Problem Relation Name Age of Onset    Aortic aneurysm Father          Allergies:  Allergies   Allergen Reactions    Bee Venom Protein (Honey Bee) Swelling    Cat Dander Itching     Sinus congestion        Home Medications:  Current Outpatient Medications   Medication Instructions    acetaminophen (TYLENOL) 325-650 mg, Every 4 hours PRN    apixaban (ELIQUIS) 5 mg, oral, 2 times daily    carvedilol (COREG) 6.25 mg, oral, 2 times daily (morning and late afternoon)    folic acid-vit B6-vit B12 (Folbic) 2.5-25-2 mg tablet 1 tablet, Daily    furosemide (Lasix) 20 mg tablet TAKE 1 TABLET BY MOUTH DAILY AS DIRECTED AS NEEDED FOR WEIGHT GAIN    glucosamine-chondroitin 250-200 mg tablet Take by  mouth.    lisinopril 10 mg, oral, Daily, as directed    magnesium glycinate 100 mg magnesium capsule Daily    thiamine 100 mg tablet 1 tablet, Daily       Inpatient Medications:            Review of Systems   Constitutional:  Positive for fatigue.   HENT: Negative.     Eyes: Negative.    Respiratory: Negative.     Cardiovascular:  Positive for palpitations.   Gastrointestinal: Negative.    Endocrine: Negative.    Genitourinary: Negative.    Musculoskeletal: Negative.    Skin: Negative.    Allergic/Immunologic: Negative.    Neurological: Negative.    Hematological: Negative.    Psychiatric/Behavioral: Negative.            Physical Exam  General:  Patient is awake, alert, and oriented.  Patient is in no acute distress.  HEENT:  Pupils equal and reactive.  Normocephalic.  Moist mucosa.    Neck:  No JVD.   Cardiovascular:  IRR.  No murmurs/rubs/gallops. Radial pulses 2+.   Pulmonary:  Clear to auscultation bilaterally.  Abdomen:  Soft. Non-tender.   Non-distended.  Positive bowel sounds.  Lower Extremities:  Pedal pulses 2+ No LE edema.  Neurologic:  Cranial nerves II-XII grossly intact.   No focal deficit.   Skin: Skin warm and dry, no lesions. Normal skin turgor.   Psychiatric: Normal affect.     Sedation Plan    ASA 3     Mallampati class: III.    Risks, benefits, and alternatives discussed with patient.         NPO since 0000    Last Recorded Vitals  Blood pressure 134/90, pulse (!) 115, temperature 36.4 °C (97.5 °F), resp. rate 16, SpO2 100%.         Vitals from the Past 24 Hours  Heart Rate:  [115]   Temp:  [36.4 °C (97.5 °F)]   Resp:  [16]   BP: (134)/(90)   SpO2:  [100 %]          Relevant Results    Labs    POCT Glucose:      POCT Urine Pregnancy:       CBC:   Recent Labs     02/06/25  0855 03/14/24  0855 10/13/21  0856 11/04/19  0823 10/22/19  0836 10/28/17  0511   WBC 6.6 5.0 4.8 6.5 4.9 5.5   HGB 15.0 13.9 12.1* 13.0* 12.3* 15.1   HCT 45.3 42.0 37.3* 39.3* 37.6* 44.0    237 249 258 232 166   MCV 93.4  "92 93 93 93 90     BMP/CMP:   Recent Labs     02/06/25  0855 03/14/24  0855 10/13/21  0856 11/04/19  0823 10/22/19  0836 10/27/17  0504 10/23/17  0530 10/22/17  1222    134* 132* 131* 127* 133*   < >  --    K 4.9 5.0 4.9 4.6 4.8 3.4*   < >  --    CL 98 98 99 97* 95* 94*   < >  --    BUN 13 14 10 8 6 34*   < >  --    CREATININE 0.90 0.94 0.77 0.70 0.66 1.32*   < >  --    CO2 26 29 24 26 24 32   < >  --    CALCIUM 9.8 9.5 9.6 9.6 9.6 8.8   < >  --    PROT  --  6.8 6.9  --  6.9  --   --  6.1*   BILITOT  --  0.9 0.8  --  0.8  --   --  1.7*   ALKPHOS  --  51 65  --  48  --   --  57   ALT  --  12 13  --  11  --   --  55*   AST  --  16 21  --  18  --   --  43*   GLUCOSE 89 88 90 82 75 87   < >  --     < > = values in this interval not displayed.      Magnesium:   Recent Labs     10/25/17  0457 10/23/17  0530   MG 2.00 2.30     Lipid Panel:   Recent Labs     03/14/24  0855 10/13/21  0856 10/22/19  0836   CHOL 205* 196 190   HDL 85.2 76.8 81.9   CHHDL 2.4 2.6 2.3   VLDL 16 12 15   TRIG 79 62 76   NHDL 120  --   --      Cardiac       No lab exists for component: \"CK\", \"CKMBP\"   Hemoglobin A1C:   Recent Labs     10/13/21  0856 10/23/17  0530   HGBA1C 5.2 6.0     TSH/ Free T4:   Recent Labs     03/14/24  0855 11/04/19  0921 10/24/17  0521 10/23/17  0530   TSH 1.50 1.31  --  7.77*   FREET4  --   --  1.26  --      Iron:   Recent Labs     10/13/21  0856 10/25/17  0457 10/24/17  0521 10/22/17  0940   FERRITIN 183  --   --   --    TIBC 305 253 249  --    IRONSAT 42 13* 14*  --    BNP  --   --   --  886*     Coag:     ABO: No results found for: \"ABO\"    Past Cardiology Tests (Last 3 Years):    EKG:  Recent Labs     12/20/24  1234 06/05/24  1000 04/23/24  1520 02/26/24  1500 05/16/22  1616 03/16/20  1458 01/28/19  1640   ATRRATE 110 104 97   < > 53 58 62   VENTRATE 113 92 113   < > 53 58 62   PRINT  --   --   --   --  150 122 150   QRSDUR 90 104 72   < > 94 86 92   QTCFRED 429 454 400   < > 439 444 439   QTCCALCB 477 487 444   < " > 429 441 440    < > = values in this interval not displayed.     Encounter Date: 12/19/24   ECG 12 lead (Clinic Performed)   Result Value    Ventricular Rate 113    Atrial Rate 110    QRS Duration 90    QT Interval 348    QTC Calculation(Bazett) 477    R Axis 70    T Axis 48    QRS Count 19    Q Onset 220    T Offset 394    QTC Fredericia 429    Narrative    Atrial fibrillation with rapid ventricular response  Abnormal ECG  When compared with ECG of 05-JUN-2024 09:56,  Criteria for Septal infarct are no longer Present  Confirmed by Renato Reeves ([id number]) on 12/22/2024 8:13:06 PM     Echo:  Echocardiogram:   Transthoracic Echo (TTE) Complete 03/08/2024    [facility], [address] [place] [address]  Tel [phone] and Fax [phone]    TRANSTHORACIC ECHOCARDIOGRAM REPORT      Patient Name:      SARAH Fuentes Physician:    [id number] Montelongo DO  Study Date:        3/8/2024             Ordering Provider:    [id number] LOLY MONTELONGO  MRN/PID:           [id number]             Fellow:  Accession#:        [id number]         Nurse:  Date of Birth/Age: 1960 / 63 years  Sonographer:          Елена Scott RDCS, RVT  Gender:            M                    Additional Staff:  Height:            177.80 cm            Admit Date:  Weight:            84.37 kg             Admission Status:     Outpatient  BSA / BMI:         2.02 m2 / 26.69      Encounter#:           [id number]  kg/m2  Department Location:  [facility]  Blood Pressure: 112 /78 mmHg    Study Type:    TRANSTHORACIC ECHO (TTE) COMPLETE  Diagnosis/ICD: Paroxysmal atrial fibrillation-I48.0  Indication:    Paroxysmal afib.  CPT Code:      Echo Complete w Full Doppler-[id number]    Patient History:  Pertinent History: Paroxysmal afib. COPD. CHF.    Study Detail: Technically challenging study due to poor parasternal windows.      PHYSICIAN INTERPRETATION:  Left Ventricle: The left ventricular systolic function is  normal, with an estimated ejection fraction of 55-60%. The patient is in atrial fibrillation which may influence the estimate of left ventricular function and transvalvular flows. There are no regional wall motion abnormalities. The left ventricular cavity size is normal. Left ventricular diastolic filling was indeterminate.  Left Atrium: The left atrium is moderately dilated.  Right Ventricle: The right ventricle is normal in size. There is normal right ventricular global systolic function.  Right Atrium: The right atrium is normal in size.  Aortic Valve: The aortic valve was not well visualized. There is trivial aortic valve regurgitation. The peak instantaneous gradient of the aortic valve is 5.4 mmHg.  Mitral Valve: The mitral valve is normal in structure. There is trace mitral valve regurgitation.  Tricuspid Valve: The tricuspid valve is structurally normal. There is trace tricuspid regurgitation.  Pulmonic Valve: The pulmonic valve is not well visualized. There is no indication of pulmonic valve regurgitation.  Pericardium: There is no pericardial effusion noted.  Aorta: The aortic root is normal.      CONCLUSIONS:  1. Left ventricular systolic function is normal with a 55-60% estimated ejection fraction.  2. The left atrium is moderately dilated.  3. HR 110s-120s.  4. The patient is in atrial fibrillation which may influence the estimate of left ventricular function and transvalvular flows.    QUANTITATIVE DATA SUMMARY:  LA VOLUME:  Normal Ranges:  LA Vol A4C:        102.6 ml   (22+/-6mL/m2)  LA Vol A2C:        85.7 ml  LA Vol BP:         94.5 ml  LA Vol Index A4C:  50.7 ml/m2  LA Vol Index A2C:  42.3 ml/m2  LA Vol Index BP:   46.7 ml/m2  LA Area A4C:       27.8 cm2  LA Area A2C:       25.6 cm2  LA Major Axis A4C: 6.4 cm  LA Major Axis A2C: 6.5 cm  LA Vol A4C:        97.0 ml  LA Vol A2C:        81.1 ml    RA VOLUME BY A/L METHOD:  Normal Ranges:  RA Area A4C: 13.9 cm2    LV SYSTOLIC FUNCTION BY 2D PLANIMETRY  (MOD):  Normal Ranges:  EF-A4C View: 57.1 % (>=55%)  EF-A2C View: 53.7 %  EF-Biplane:  56.9 %    LV DIASTOLIC FUNCTION:  Normal Ranges:  MV Peak E:    0.78 m/s (0.7-1.2 m/s)  MV lateral e' 0.13 m/s  MV medial e'  0.11 m/s    MITRAL VALVE:  Normal Ranges:  MV DT: 154 msec (150-240msec)    AORTIC VALVE:  Normal Ranges:  AoV Vmax:      1.16 m/s (<=1.7m/s)  AoV Peak P.4 mmHg (<20mmHg)  LVOT Max Manjinder:  0.81 m/s (<=1.1m/s)  LVOT VTI:      14.38 cm  LVOT Diameter: 2.19 cm  (1.8-2.4cm)  AoV Area,Vmax: 2.62 cm2 (2.5-4.5cm2)      RIGHT VENTRICLE:  RV Basal 3.00 cm  RV Mid   2.20 cm  RV Major 6.2 cm  TAPSE:   19.0 mm  RV s'    0.10 m/s    TRICUSPID VALVE/RVSP:  Normal Ranges:  Peak TR Velocity: 2.54 m/s  RV Syst Pressure: 28.8 mmHg (< 30mmHg)  IVC Diam:         1.40 cm      [id number] Brady Cota DO  Electronically signed on 3/8/2024 at 4:37:01 PM        ** Final **    Ejection Fractions:  LV EF   Date/Time Value Ref Range Status   2024 02:45 PM 57 %      Cath:  Coronary Angiography: No results found for this or any previous visit from the past 1800 days.    Right Heart Cath: No results found for this or any previous visit from the past 1800 days.    Stress Test:  Nuclear:No results found for this or any previous visit from the past 1800 days.    Metabolic Stress: No results found for this or any previous visit from the past 1800 days.    Cardiac Imaging:  Cardiac Scoring: No results found for this or any previous visit from the past 1800 days.    Cardiac MRI: No results found for this or any previous visit from the past 1800 days.         Assessment/Plan  Assessment/Plan   Assessment & Plan  Paroxysmal atrial fibrillation (Multi)        -Afib ablation with Dr. Reeves today 25  -Resume Eliquis tonight post ablation pending hemostasis   -PPI Prophylaxis: Protonix 40 mg daily x 30 days post ablation         NP discussed with Dr. Reeves regarding plan of care/ discharge plan      I spent 30 minutes in the professional and  overall care of this patient.      Kaley Monge, APRN-CNP

## 2025-02-13 NOTE — ANESTHESIA POSTPROCEDURE EVALUATION
Patient: Dandre Carroll    Procedure Summary       Date: 02/13/25 Room / Location: U LAB 3 / Virtual [facility] Cardiac Cath Lab    Anesthesia Start: 0824 Anesthesia Stop: 1044    Procedure: Ablation A-Fib Paroxysmal Diagnosis:       Paroxysmal atrial fibrillation (Multi)      (Paroxysmal atrial fibrillation (Multi) [I48.0])    Providers: Renato Reeves MD Responsible Provider: Phu Kevin MD    Anesthesia Type: general ASA Status: Not recorded            Anesthesia Type: general    Vitals Value Taken Time   /58 02/13/25 1101   Temp 36.3 °C (97.3 °F) 02/13/25 1100   Pulse 78 02/13/25 1105   Resp 16 02/13/25 1100   SpO2 97 % 02/13/25 1105   Vitals shown include unfiled device data.    Anesthesia Post Evaluation    Patient location during evaluation: PACU  Patient participation: complete - patient participated  Level of consciousness: awake  Pain management: adequate  Airway patency: patent  Cardiovascular status: acceptable  Respiratory status: acceptable  Hydration status: acceptable  Postoperative Nausea and Vomiting: none    There were no known notable events for this encounter.

## 2025-02-13 NOTE — PERIOPERATIVE NURSING NOTE
1038 Assuming care of pt at this time. Bedside report received from outgoing RN. Pt arrived to pacu bay at this time, report received from OR and anesthesia staff. Phase 1 care started.   Hemostasis at 1030  No hematomas at sites  1045 Message sent to family via text at this time. No hematomas   1100 no hematomas  1115 no hematomas  1130 no hematomas   1145 no hematomas   1200 lunch ordered, no hematomas    1215 handoff given to adri

## 2025-02-13 NOTE — DISCHARGE INSTRUCTIONS
DISCHARGE INSTRUCTIONS: Post Cardiac Ablation       FOR SUDDEN AND SEVERE CHEST PAIN, SHORTNESS OF BREATH, EXCESSIVE BLEEDING, SIGNS OF STROKE, OR CHANGES IN MENTAL STATUS YOU SHOULD CALL 911 IMMEDIATELY.     -Resume tonight following ablation. Please do not miss any doses of your anticoagulant (Eliquis). This is very important to prevent the risk of stroke.     -Start Amiodarone 400 mg (2 tablets) TWICE daiy x 7 days, then 200 mg (1 tablet) TWICE daily x 7 days, then 200 mg (1 tablet) daily. Starting tomorrow 2/14/25.     STOP Carvedilol.     Start Metoprolol Succinate 50 md daily starting tomorrow 2/14/25.     -Start Pantoprazole 40 mg daily before breakfast (Protonix) to help protect your esophagus and reduce irritation/reflux for 30 days post procedure. Start tomorrow 2/14/25.     -Follow up with  Dr. Reeves in around 6 weeks post ablation. Appointment requested. Please call the office if you do not hear anything in 3 business days         FOR NEXT 24 HOURS  - Upon discharge, you should return home and rest for the remainder of the day and evening. You do not have to stay on bed rest but should not be very active.  It is recommended a responsible adult be with you for the first 24 hours after the procedure.    - No driving for 24 hours after procedure. Please arrange for someone to drive you home from the hospital today.     - Do not drive, operate machinery, or use power tools for 24 hours after your procedure.     - Do not make any legal decisions for 24 hours after your procedure.     - Do not drink alcoholic beverages for 24 hours after your procedure.    -Stay extra hydrated for 24 hours after your procedure; goal fluid intake around 72-96 ounces for the next 24 hours.       WOUND CARE   *FOR FEMORAL (LEG) ACCESS*  ·      Avoid heavy lifting (over 10 pounds) for 7 days, squatting or excessive bending for 2 days, and strenuous exercise for 7 days.  ·      No submerged bathing, swimming, or hot tubs for  the next 7 days, or until fully healed.  ·      Avoid sexual activity for 3-4 days until any groin discomfort has ceased.    - The transparent dressing should be removed from the site 24 hours after the procedure.  Wash the site gently with soap and water. Rinse well and pat dry. Keep the area clean and dry. You may apply a Band-Aid to the site. Avoid lotions, ointments, or powders until fully healed.     - You may shower the day after your procedure.      - It is normal to notice a small bruise around the puncture site and/or a small grape sized or smaller lump. Any large bruising or large lump warrants a call to the office.     - If bleeding should occur, lay down and apply pressure to the affected area for 10 minutes.  If the bleeding stops notify your physician.  If there is a large amount of bleeding or spurting of blood CALL 911 immediately.  DO NOT drive yourself to the hospital.    - You may experience some tenderness, bruising or minimal inflammation.  If you have any concerns, you may contact the Cath Lab or if any of these symptoms become excessive, contact your cardiologist or go to the emergency room.     OTHER INSTRUCTIONS  - You may take acetaminophen (Tylenol) as directed for discomfort.  If pain is not relieved with acetaminophen (Tylenol), contact your doctor.    - If you notice or experience any of the following, you should notify your doctor or seek medical attention  Chest pain or discomfort  Change in mental status or weakness in extremities.  Dizziness, light headedness, or feeling faint.  Change in the site where the procedure was performed, such as bleeding or an increased area of bruising or swelling.  Tingling, numbness, pain, or coolness in the leg/arm beyond the site where the procedure was performed.  Signs of infection (i.e. shaking chills, temperature > 100 degrees Fahrenheit, warmth, redness) in the leg/arm area where the procedure was performed.  Changes in urination   Bloody or  black stools  Vomiting blood  Severe nose bleeds  Any excessive bleeding

## 2025-02-13 NOTE — ANESTHESIA PROCEDURE NOTES
Arterial Line:    Date/Time: 2/13/2025 8:46 AM    Staffing  Performed: attending   Authorized by: Phu Kevin MD    Performed by: Ezekiel Lanza    An arterial line was placed. Procedure performed using ultrasound guidance.in the OR for the following indication(s): continuous blood pressure monitoring.    A 20 gauge (size), 1 and 3/4 inch (length), Angiocath (type) catheter was placed into the Right brachial artery, secured by Tegadecarmine   Seldinger technique used.  Events:  patient tolerated procedure well with no complications.      Additional notes:  Attempted right radial, thready pulse, unsuccessful attempt. Utilized ultrasound for right brachial placement, successful placement.

## 2025-02-13 NOTE — ANESTHESIA POSTPROCEDURE EVALUATION
Patient: Dandre Carroll    Procedure Summary       Date: 02/13/25 Room / Location: U LAB 3 / [facility] Cardiac Cath Lab    Anesthesia Start: 0824 Anesthesia Stop: 1044    Procedure: Ablation A-Fib Paroxysmal Diagnosis:       Paroxysmal atrial fibrillation (Multi)      (Paroxysmal atrial fibrillation (Multi) [I48.0])    Providers: Renato Reeves MD Responsible Provider: Phu Kevin MD    Anesthesia Type: general ASA Status: Not recorded            Anesthesia Type: general    Vitals Value Taken Time   /58 02/13/25 1101   Temp 36.3 °C (97.3 °F) 02/13/25 1100   Pulse 78 02/13/25 1104   Resp 16 02/13/25 1100   SpO2 97 % 02/13/25 1104   Vitals shown include unfiled device data.    Anesthesia Post Evaluation    Patient location during evaluation: PACU  Patient participation: complete - patient participated  Level of consciousness: awake  Pain management: adequate  Airway patency: patent  Cardiovascular status: acceptable  Respiratory status: acceptable  Hydration status: acceptable  Postoperative Nausea and Vomiting: none    There were no known notable events for this encounter.

## 2025-02-13 NOTE — ANESTHESIA PROCEDURE NOTES
Airway  Date/Time: 2/13/2025 8:56 AM  Urgency: elective    Airway not difficult    Staffing  Performed: CRNA   Authorized by: Phu Kevin MD    Performed by: Ezekiel Lanza  Patient location during procedure: OR    Indications and Patient Condition  Indications for airway management: anesthesia  Spontaneous ventilation: present  Sedation level: minimal  Preoxygenated: yes  Patient position: sniffing  MILS maintained throughout  Mask difficulty assessment: 1 - vent by mask  Planned trial extubation    Final Airway Details  Final airway type: endotracheal airway      Successful airway: ETT  Cuffed: yes   Successful intubation technique: direct laryngoscopy  Facilitating devices/methods: intubating stylet and anterior pressure/BURP  Endotracheal tube insertion site: oral  Blade: Donna  Blade size: #4  ETT size (mm): 7.5  Cormack-Lehane Classification: grade IIa - partial view of glottis  Placement verified by: chest auscultation and capnometry   Measured from: lips  ETT to lips (cm): 23  Number of attempts at approach: 1

## 2025-02-13 NOTE — ANESTHESIA PREPROCEDURE EVALUATION
Patient: Dandre Carroll    Procedure Information       Date/Time: 25    Procedure: Ablation A-Fib Paroxysmal    Location: [facility] / [facility] Cardiac [facility]    Providers: Renato Reeves MD            Relevant Problems   Cardiac   (+) Essential (primary) hypertension   (+) Paroxysmal atrial fibrillation (Multi)      Hematology   (+) Normocytic anemia       Clinical information reviewed:                    Past Medical History:   Diagnosis Date   • Atrial fibrillation (Multi)    • Hypertension    • Personal history of other diseases of the circulatory system 2019    History of cardiomyopathy      Past Surgical History:   Procedure Laterality Date   • OTHER SURGICAL HISTORY  10/17/2019    Tonsillectomy   • OTHER SURGICAL HISTORY  10/12/2021    Cataract surgery   • OTHER SURGICAL HISTORY  10/12/2021    Colonoscopy     Social History     Tobacco Use   • Smoking status: Former     Current packs/day: 0.00     Average packs/day: 0.5 packs/day for 11.0 years (5.5 ttl pk-yrs)     Types: Cigarettes     Start date:      Quit date:      Years since quittin.1   • Smokeless tobacco: Never   Substance Use Topics   • Alcohol use: Yes     Alcohol/week: 14.0 standard drinks of alcohol     Types: 14 Cans of beer per week   • Drug use: Never      Current Outpatient Medications   Medication Instructions   • acetaminophen (TYLENOL) 325-650 mg, Every 4 hours PRN   • apixaban (ELIQUIS) 5 mg, oral, 2 times daily   • carvedilol (COREG) 6.25 mg, oral, 2 times daily (morning and late afternoon)   • folic acid-vit B6-vit B12 (Folbic) 2.5-25-2 mg tablet 1 tablet, Daily   • furosemide (Lasix) 20 mg tablet TAKE 1 TABLET BY MOUTH DAILY AS DIRECTED AS NEEDED FOR WEIGHT GAIN   • glucosamine-chondroitin 250-200 mg tablet Take by mouth.   • lisinopril 10 mg, oral, Daily, as directed   • magnesium glycinate 100 mg magnesium capsule Daily   • thiamine 100 mg tablet 1 tablet, Daily      Allergies   Allergen Reactions   •  "Bee Venom Protein (Honey Bee) Swelling   • Cat Dander Itching     Sinus congestion        Chemistry    Lab Results   Component Value Date/Time     02/06/2025 0855    K 4.9 02/06/2025 0855    CL 98 02/06/2025 0855    CO2 26 02/06/2025 0855    BUN 13 02/06/2025 0855    CREATININE 0.90 02/06/2025 0855    Lab Results   Component Value Date/Time    CALCIUM 9.8 02/06/2025 0855    ALKPHOS 51 03/14/2024 0855    AST 16 03/14/2024 0855    ALT 12 03/14/2024 0855    BILITOT 0.9 03/14/2024 0855          Lab Results   Component Value Date    HGBA1C 5.2 10/13/2021     Lab Results   Component Value Date/Time    WBC 6.6 02/06/2025 0855    HGB 15.0 02/06/2025 0855    HCT 45.3 02/06/2025 0855     02/06/2025 0855     Lab Results   Component Value Date/Time    PROTIME 19.6 (H) 10/23/2017 1617    INR 1.8 (H) 10/23/2017 1617     No results found for: \"ABORH\"  Encounter Date: 12/19/24   ECG 12 lead (Clinic Performed)   Result Value    Ventricular Rate 113    Atrial Rate 110    QRS Duration 90    QT Interval 348    QTC Calculation(Bazett) 477    R Axis 70    T Axis 48    QRS Count 19    Q Onset 220    T Offset 394    QTC Fredericia 429    Narrative    Atrial fibrillation with rapid ventricular response  Abnormal ECG  When compared with ECG of 05-JUN-2024 09:56,  Criteria for Septal infarct are no longer Present  Confirmed by Renato Reeves ([id number]) on 12/22/2024 8:13:06 PM     No results found for this or any previous visit from the past 1095 days.       Visit Vitals  Smoking Status Former     No data recorded    Physical Exam    Airway  Mallampati: II  TM distance: >3 FB  Neck ROM: full     Cardiovascular - normal exam  Rhythm: irregular     Dental - normal exam     Pulmonary - normal exam     Abdominal - normal exam         Anesthesia Plan    History of general anesthesia?: yes  History of complications of general anesthesia?: no    ASA 2     general     The patient is not a current smoker.    intravenous induction   Anesthetic " plan and risks discussed with patient.    Plan discussed with CRNA and attending.

## 2025-02-18 LAB
ATRIAL RATE: 78 BPM
P AXIS: 57 DEGREES
P OFFSET: 194 MS
P ONSET: 132 MS
PR INTERVAL: 166 MS
Q ONSET: 215 MS
Q ONSET: 216 MS
QRS COUNT: 13 BEATS
QRS COUNT: 26 BEATS
QRS DURATION: 84 MS
QRS DURATION: 92 MS
QT INTERVAL: 300 MS
QT INTERVAL: 452 MS
QTC CALCULATION(BAZETT): 483 MS
QTC CALCULATION(BAZETT): 515 MS
QTC FREDERICIA: 412 MS
QTC FREDERICIA: 493 MS
R AXIS: 69 DEGREES
R AXIS: 73 DEGREES
T AXIS: 21 DEGREES
T AXIS: 58 DEGREES
T OFFSET: 366 MS
T OFFSET: 441 MS
VENTRICULAR RATE: 156 BPM
VENTRICULAR RATE: 78 BPM

## 2025-02-21 ENCOUNTER — TELEPHONE (OUTPATIENT)
Dept: PRIMARY CARE | Facility: CLINIC | Age: 65
End: 2025-02-21

## 2025-03-03 DIAGNOSIS — Z12.11 COLON CANCER SCREENING: ICD-10-CM

## 2025-03-03 RX ORDER — SODIUM, POTASSIUM,MAG SULFATES 17.5-3.13G
SOLUTION, RECONSTITUTED, ORAL ORAL
Qty: 1 EACH | Refills: 0 | Status: SHIPPED | OUTPATIENT
Start: 2025-03-03

## 2025-03-10 ENCOUNTER — APPOINTMENT (OUTPATIENT)
Dept: PRIMARY CARE | Facility: CLINIC | Age: 65
End: 2025-03-10
Payer: COMMERCIAL

## 2025-03-13 NOTE — TELEPHONE ENCOUNTER
No documentation regarding sched w TF.  Pt self-sched w another provider on 2/25.   Sign/close this encounter?

## 2025-03-18 ENCOUNTER — OFFICE VISIT (OUTPATIENT)
Dept: CARDIOLOGY | Facility: CLINIC | Age: 65
End: 2025-03-18
Payer: COMMERCIAL

## 2025-03-18 VITALS
HEIGHT: 70 IN | SYSTOLIC BLOOD PRESSURE: 147 MMHG | OXYGEN SATURATION: 97 % | DIASTOLIC BLOOD PRESSURE: 75 MMHG | HEART RATE: 68 BPM | WEIGHT: 175.31 LBS | BODY MASS INDEX: 25.1 KG/M2

## 2025-03-18 DIAGNOSIS — I48.0 PAROXYSMAL ATRIAL FIBRILLATION (MULTI): ICD-10-CM

## 2025-03-18 LAB
ATRIAL RATE: 68 BPM
P AXIS: 29 DEGREES
P OFFSET: 197 MS
P ONSET: 131 MS
PR INTERVAL: 176 MS
Q ONSET: 219 MS
QRS COUNT: 11 BEATS
QRS DURATION: 94 MS
QT INTERVAL: 460 MS
QTC CALCULATION(BAZETT): 489 MS
QTC FREDERICIA: 479 MS
R AXIS: 64 DEGREES
T AXIS: 55 DEGREES
T OFFSET: 449 MS
VENTRICULAR RATE: 68 BPM

## 2025-03-18 PROCEDURE — 3077F SYST BP >= 140 MM HG: CPT | Performed by: INTERNAL MEDICINE

## 2025-03-18 PROCEDURE — 1036F TOBACCO NON-USER: CPT | Performed by: INTERNAL MEDICINE

## 2025-03-18 PROCEDURE — 93005 ELECTROCARDIOGRAM TRACING: CPT | Performed by: INTERNAL MEDICINE

## 2025-03-18 PROCEDURE — 3008F BODY MASS INDEX DOCD: CPT | Performed by: INTERNAL MEDICINE

## 2025-03-18 PROCEDURE — 99214 OFFICE O/P EST MOD 30 MIN: CPT | Performed by: INTERNAL MEDICINE

## 2025-03-18 PROCEDURE — 3078F DIAST BP <80 MM HG: CPT | Performed by: INTERNAL MEDICINE

## 2025-03-18 ASSESSMENT — ENCOUNTER SYMPTOMS
OCCASIONAL FEELINGS OF UNSTEADINESS: 0
LOSS OF SENSATION IN FEET: 0
DEPRESSION: 0

## 2025-03-18 ASSESSMENT — PAIN SCALES - GENERAL: PAINLEVEL_OUTOF10: 0-NO PAIN

## 2025-03-18 NOTE — PROGRESS NOTES
Referred by Renato Garcia MD provider found for   Chief Complaint   Patient presents with    Follow-up    Atrial Fibrillation        Dandre Carroll is a 64 y.o. year old male patient with h/o A fib s/p RFA 6 weeks ago. Presents for follow up.     PMHx/PSHx: As above    FamHx: unremarkable     Allergies:  Allergies   Allergen Reactions    Bee Venom Protein (Honey Bee) Swelling    Cat Dander Itching     Sinus congestion        Review of Systems    Constitutional: not feeling tired.   Eyes: no eyesight problems.   ENT: no hearing loss and no nosebleeds.   Cardiovascular: no intermittent leg claudication and as noted in HPI.   Respiratory: no chronic cough and no shortness of breath.   Gastrointestinal: no change in bowel habits and no blood in stools.   Genitourinary: no urinary frequency and no hematuria.   Skin: no skin rashes.   Neurological: no seizures and no frequent falls.   Psychiatric: no depression and not suicidal.   All other systems have been reviewed and are negative for complaint.     Outpatient Medications:  Current Outpatient Medications   Medication Instructions    amiodarone (Pacerone) 200 mg tablet Take 2 tablets (400 mg) by mouth 2 times a day for 7 days, THEN 1 tablet (200 mg) 2 times a day for 7 days, THEN 1 tablet (200 mg) once daily.    apixaban (ELIQUIS) 5 mg, oral, 2 times daily    folic acid-vit B6-vit B12 (Folbic) 2.5-25-2 mg tablet 1 tablet, Daily    furosemide (Lasix) 20 mg tablet TAKE 1 TABLET BY MOUTH DAILY AS DIRECTED AS NEEDED FOR WEIGHT GAIN    glucosamine-chondroitin 250-200 mg tablet Take by mouth.    lisinopril 10 mg, oral, Daily, as directed    metoprolol succinate XL (Toprol-XL) 50 mg 24 hr tablet Take 1 tablet (50 mg) by mouth once daily. Do not crush or chew. Do not start before February 14, 2025.    pantoprazole (PROTONIX) 40 mg, oral, Daily, Do not crush, chew, or split. Take 30 days post procedure then STOP    sodium,potassium,mag sulfates (Suprep) 17.5-3.13-1.6 gram  "solution Take one bottle beginning at 6pm night before procedure and then take the other bottle 5 hours before procedure time as directed per instruction sheet    thiamine 100 mg tablet 1 tablet, Daily         Last Recorded Vitals:      2/13/2025     2:45 PM 2/13/2025     3:00 PM 2/13/2025     3:15 PM 2/13/2025     3:30 PM 2/13/2025     3:45 PM 2/13/2025     4:00 PM 3/18/2025     8:22 AM   Vitals   Systolic 117 117 130 127 115 129 147   Diastolic 86 88 79 67 68 72 75   BP Location       Left arm   Heart Rate 165 152 87 91 96 92 68   Temp  36.3 °C (97.3 °F)    36.5 °C (97.7 °F)    Resp 16 16 16 16 16 17    Height       1.778 m (5' 10\")   Weight (lb)       175.31   BMI       25.15 kg/m2   BSA (m2)       1.98 m2   Visit Report       Report    Visit Vitals  /75 (BP Location: Left arm, Patient Position: Sitting, BP Cuff Size: Adult)   Pulse 68   Ht 1.778 m (5' 10\")   Wt 79.5 kg (175 lb 5 oz)   SpO2 97%   BMI 25.15 kg/m²   Smoking Status Former   BSA 1.98 m²        Physical Exam:  Constitutional: alert and in no acute distress.   Eyes: no erythema, swelling or discharge from the eye .   Neck: neck is supple, symmetric, trachea midline, no masses  and no thyromegaly .   Pulmonary: no increased work of breathing or signs of respiratory distress  and lungs clear to auscultation.    Cardiovascular: carotid pulses 2+ bilaterally with no bruit , JVP was normal, no thrills , regular rhythm, normal S1 and S2, no murmurs , pedal pulses 2+ bilaterally  and no edema .   Abdomen: abdomen non-tender, no masses  and no hepatomegaly .   Skin: skin warm and dry, normal skin turgor .   Psychiatric judgment and insight is normal  and oriented to person, place and time .        Assessment/Plan   Problem List Items Addressed This Visit             ICD-10-CM    Paroxysmal atrial fibrillation (Multi) I48.0    Relevant Orders    ECG 12 lead (Clinic Performed)       Dandre Carroll is a 64 y.o. year old male patient with h/o A fib s/p RFA 6 " weeks ago. Presents for follow up.   The patient reports doing well and denies symptoms. Will continue his current medications and follow up in 6 weeks (3 months after ablation).         Renato Reeves MD  Cardiac Electrophysiology      Thank you very much for allowing me to participate in the care of this pleasant patient. Please do not hesitate to contact me with any further questions or concerns regarding his care.    **Disclaimer: This note was dictated by speech recognition, and every effort has been made to prevent any error in transcription, however minor errors may be present**

## 2025-03-19 ENCOUNTER — APPOINTMENT (OUTPATIENT)
Dept: PRIMARY CARE | Facility: CLINIC | Age: 65
End: 2025-03-19
Payer: COMMERCIAL

## 2025-03-19 VITALS
OXYGEN SATURATION: 97 % | DIASTOLIC BLOOD PRESSURE: 84 MMHG | TEMPERATURE: 98 F | SYSTOLIC BLOOD PRESSURE: 152 MMHG | BODY MASS INDEX: 24.74 KG/M2 | WEIGHT: 172.4 LBS | HEART RATE: 62 BPM

## 2025-03-19 DIAGNOSIS — I10 ESSENTIAL (PRIMARY) HYPERTENSION: ICD-10-CM

## 2025-03-19 DIAGNOSIS — K82.4 GALLBLADDER POLYP: ICD-10-CM

## 2025-03-19 DIAGNOSIS — Z12.5 SCREENING FOR PROSTATE CANCER: ICD-10-CM

## 2025-03-19 DIAGNOSIS — I48.0 PAROXYSMAL ATRIAL FIBRILLATION (MULTI): ICD-10-CM

## 2025-03-19 DIAGNOSIS — L98.9 SKIN LESION OF BACK: Primary | ICD-10-CM

## 2025-03-19 DIAGNOSIS — R16.0 LIVER MASS, RIGHT LOBE: ICD-10-CM

## 2025-03-19 DIAGNOSIS — N52.9 ERECTILE DYSFUNCTION, UNSPECIFIED ERECTILE DYSFUNCTION TYPE: ICD-10-CM

## 2025-03-19 PROCEDURE — 3079F DIAST BP 80-89 MM HG: CPT | Performed by: NURSE PRACTITIONER

## 2025-03-19 PROCEDURE — 99203 OFFICE O/P NEW LOW 30 MIN: CPT | Performed by: NURSE PRACTITIONER

## 2025-03-19 PROCEDURE — 3077F SYST BP >= 140 MM HG: CPT | Performed by: NURSE PRACTITIONER

## 2025-03-19 PROCEDURE — 1036F TOBACCO NON-USER: CPT | Performed by: NURSE PRACTITIONER

## 2025-03-19 RX ORDER — PREDNISOLONE ACETATE 10 MG/ML
SUSPENSION/ DROPS OPHTHALMIC
COMMUNITY
Start: 2025-03-06

## 2025-03-19 RX ORDER — FOLIC ACID 1 MG/1
1 TABLET ORAL DAILY
COMMUNITY

## 2025-03-19 ASSESSMENT — ENCOUNTER SYMPTOMS
SPEECH DIFFICULTY: 0
HEADACHES: 0
ABDOMINAL PAIN: 0
BLOOD IN STOOL: 0
PALPITATIONS: 0
NUMBNESS: 0
NECK PAIN: 0
NERVOUS/ANXIOUS: 0
BRUISES/BLEEDS EASILY: 0
POLYDIPSIA: 0
NAUSEA: 0
CONSTIPATION: 0
WHEEZING: 0
ARTHRALGIAS: 0
CHEST TIGHTNESS: 0
EYE ITCHING: 0
COUGH: 0
EYE REDNESS: 0
FREQUENCY: 0
SHORTNESS OF BREATH: 0
SLEEP DISTURBANCE: 0
BACK PAIN: 0
ROS SKIN COMMENTS: LESION ON BACK
POLYPHAGIA: 0
SINUS PRESSURE: 0
FEVER: 0
DIZZINESS: 0
RHINORRHEA: 0
DYSURIA: 0
WEAKNESS: 0
FATIGUE: 0
CHILLS: 0
DIFFICULTY URINATING: 0
HEMATURIA: 0
EYE PAIN: 0
UNEXPECTED WEIGHT CHANGE: 0
DIARRHEA: 0
SORE THROAT: 0
PHOTOPHOBIA: 0
DYSPHORIC MOOD: 0
MYALGIAS: 0
ADENOPATHY: 0
EYE DISCHARGE: 0
VOMITING: 0

## 2025-03-19 NOTE — PROGRESS NOTES
Subjective   Dandre Carroll is a 64 y.o. male who presents for New Patient Visit (Establish care with Soumya. He would like to address dark color spot on his back that might be a bump but not sure, pt's wife noticed this a couple months ago. Lastly, he would like to discuss his ED.).    HPI  He presents to the office today to establish care.   Has had a lot of issues with Atrial fibrillation over the years. Recently had an ablation 2/13/2025. Overall is feeling much better.  He is tolerating medications well at current dose- no side effects. No chest pain, shortness of breath, palpitations or edema. No headaches, numbness, tingling, weakness or vision changes.  No dizziness.  Home blood pressure readings: No home BP readings.  Last eye exam: Currently every month  Diet:Overall pretty healthy  Exercise: walks daily about [id number] steps   2 beers a night.  Weight: stable  Colonoscopy scheduled 4/10/2025 (Dr. Oliva)  PSA ordered today  Last labs: ordered today.    His wife noticed a dark spot on his back. This was noticed a couple months ago. Wife does not feel it has gotten any bigger.  No itching, pain or bleeding.     He also is interested in starting medication for ED. In the past few years has had a hard time obtaining and maintaining an erection. Report does not obtain a full erection.  Has been prescribed Viagra in the past but never took it.  Is interested in trying a medication now.    Review of Systems   Constitutional:  Negative for chills, fatigue, fever and unexpected weight change.   HENT:  Negative for congestion, ear pain, hearing loss, nosebleeds, postnasal drip, rhinorrhea, sinus pressure, sore throat and tinnitus.    Eyes:  Negative for photophobia, pain, discharge, redness, itching and visual disturbance.   Respiratory:  Negative for cough, chest tightness, shortness of breath and wheezing.    Cardiovascular:  Negative for chest pain, palpitations and leg swelling.   Gastrointestinal:  Negative  for abdominal pain, blood in stool, constipation, diarrhea, nausea and vomiting.   Endocrine: Negative for cold intolerance, heat intolerance, polydipsia, polyphagia and polyuria.   Genitourinary:  Negative for difficulty urinating, dysuria, frequency, hematuria and urgency.   Musculoskeletal:  Negative for arthralgias, back pain, myalgias and neck pain.   Skin:  Negative for rash.        Lesion on back   Neurological:  Negative for dizziness, syncope, speech difficulty, weakness, numbness and headaches.   Hematological:  Negative for adenopathy. Does not bruise/bleed easily.   Psychiatric/Behavioral:  Negative for dysphoric mood and sleep disturbance. The patient is not nervous/anxious.        Objective   /84 (BP Location: Left arm, Patient Position: Sitting)   Pulse 62   Temp 36.7 °C (98 °F) (Temporal)   Wt 78.2 kg (172 lb 6.4 oz)   SpO2 97%   BMI 24.74 kg/m²     Physical Exam  Constitutional:       General: He is not in acute distress.     Appearance: Normal appearance. He is not toxic-appearing.   Eyes:      Extraocular Movements: Extraocular movements intact.      Conjunctiva/sclera: Conjunctivae normal.      Pupils: Pupils are equal, round, and reactive to light.   Cardiovascular:      Rate and Rhythm: Normal rate and regular rhythm.      Pulses: Normal pulses.      Heart sounds: Normal heart sounds, S1 normal and S2 normal. No murmur heard.  Pulmonary:      Effort: Pulmonary effort is normal. No respiratory distress.      Breath sounds: Normal breath sounds.   Abdominal:      General: Bowel sounds are normal.      Palpations: Abdomen is soft.      Tenderness: There is no abdominal tenderness.   Musculoskeletal:      Right lower leg: No edema.      Left lower leg: No edema.   Lymphadenopathy:      Cervical: No cervical adenopathy.   Skin:     Comments: (+) raised hyperpigmented lesion to the back -there are darker areas noted throughout the lesion. No erythema or bleeding noted.   Neurological:       Mental Status: He is alert and oriented to person, place, and time.   Psychiatric:         Attention and Perception: Attention normal.         Mood and Affect: Mood and affect normal.         Behavior: Behavior normal. Behavior is cooperative.         Thought Content: Thought content normal.         Cognition and Memory: Cognition normal.         Judgment: Judgment normal.         Assessment/Plan   Problem List Items Addressed This Visit       Erectile dysfunction     Will plan to start Viagra if ok with cardiology. Reviewed possible side effects today.         Essential (primary) hypertension     Elevated today. Continue to monitor. He has a follow up with cardiology on 4/29.         Relevant Orders    Lipid Panel    Hepatic function panel    Liver mass, right lobe     Check updated RUQ US to follow up.         Relevant Orders    US right upper quadrant    Paroxysmal atrial fibrillation (Multi)     He is following with cardiology for management s/p ablation.          Other Visit Diagnoses       Skin lesion of back    -  Primary    Relevant Orders    Referral to Dermatology    Screening for prostate cancer        Relevant Orders    Prostate Specific Antigen    Gallbladder polyp        Relevant Orders    US right upper quadrant        Given the lesion on the back seems to be new and has darker areas noted throughout, will refer to dermatology for further evaluation.    It has been a pleasure seeing you today!

## 2025-03-21 ENCOUNTER — APPOINTMENT (OUTPATIENT)
Dept: RADIOLOGY | Facility: CLINIC | Age: 65
End: 2025-03-21
Payer: COMMERCIAL

## 2025-03-21 DIAGNOSIS — N52.9 ERECTILE DYSFUNCTION, UNSPECIFIED ERECTILE DYSFUNCTION TYPE: Primary | ICD-10-CM

## 2025-03-21 LAB
ALBUMIN SERPL-MCNC: 4.6 G/DL (ref 3.6–5.1)
ALBUMIN/GLOB SERPL: 1.8 (CALC) (ref 1–2.5)
ALP SERPL-CCNC: 45 U/L (ref 35–144)
ALT SERPL-CCNC: 10 U/L (ref 9–46)
AST SERPL-CCNC: 18 U/L (ref 10–35)
BILIRUB DIRECT SERPL-MCNC: 0.1 MG/DL
BILIRUB INDIRECT SERPL-MCNC: 0.6 MG/DL (CALC) (ref 0.2–1.2)
BILIRUB SERPL-MCNC: 0.7 MG/DL (ref 0.2–1.2)
CHOLEST SERPL-MCNC: 248 MG/DL
CHOLEST/HDLC SERPL: 2.5 (CALC)
GLOBULIN SER CALC-MCNC: 2.5 G/DL (CALC) (ref 1.9–3.7)
HDLC SERPL-MCNC: 98 MG/DL
LDLC SERPL CALC-MCNC: 133 MG/DL (CALC)
NONHDLC SERPL-MCNC: 150 MG/DL (CALC)
PROT SERPL-MCNC: 7.1 G/DL (ref 6.1–8.1)
PSA SERPL-MCNC: 3.92 NG/ML
TRIGL SERPL-MCNC: 78 MG/DL

## 2025-03-21 RX ORDER — SILDENAFIL 50 MG/1
50 TABLET, FILM COATED ORAL DAILY PRN
Qty: 12 TABLET | Refills: 2 | Status: SHIPPED | OUTPATIENT
Start: 2025-03-21 | End: 2026-03-21

## 2025-03-24 ENCOUNTER — APPOINTMENT (OUTPATIENT)
Dept: CARDIOLOGY | Facility: HOSPITAL | Age: 65
End: 2025-03-24
Payer: COMMERCIAL

## 2025-03-31 ENCOUNTER — APPOINTMENT (OUTPATIENT)
Dept: RADIOLOGY | Facility: CLINIC | Age: 65
End: 2025-03-31
Payer: COMMERCIAL

## 2025-04-01 ENCOUNTER — HOSPITAL ENCOUNTER (OUTPATIENT)
Dept: RADIOLOGY | Facility: CLINIC | Age: 65
Discharge: HOME | End: 2025-04-01
Payer: COMMERCIAL

## 2025-04-01 ENCOUNTER — APPOINTMENT (OUTPATIENT)
Dept: CARDIOLOGY | Facility: HOSPITAL | Age: 65
End: 2025-04-01
Payer: COMMERCIAL

## 2025-04-01 DIAGNOSIS — K82.4 GALLBLADDER POLYP: ICD-10-CM

## 2025-04-01 DIAGNOSIS — R16.0 LIVER MASS, RIGHT LOBE: ICD-10-CM

## 2025-04-01 PROCEDURE — 76705 ECHO EXAM OF ABDOMEN: CPT

## 2025-04-07 ENCOUNTER — TELEPHONE (OUTPATIENT)
Dept: CARDIOLOGY | Facility: CLINIC | Age: 65
End: 2025-04-07
Payer: COMMERCIAL

## 2025-04-08 NOTE — TELEPHONE ENCOUNTER
TCT patient and explained that unlikely Dr. Reeves would approve holding eliquis this soon after ablation for elective colonoscopy. Instructed to get approval for holding from Dr. Reeves's office. States understanding and that he will reschedule colonoscopy to after his next FUV with EP later this month.

## 2025-04-08 NOTE — TELEPHONE ENCOUNTER
Patient scheduled for colonoscopy 4/10/2025. Had RFA ablation 7 weeks ago. TCT both numbers on file for patient and requested return call. Unclear if he has already been off eliquis for past 5 days which is concerning post ablation

## 2025-04-10 ENCOUNTER — APPOINTMENT (OUTPATIENT)
Dept: GASTROENTEROLOGY | Facility: EXTERNAL LOCATION | Age: 65
End: 2025-04-10
Payer: COMMERCIAL

## 2025-04-10 DIAGNOSIS — K74.60 HEPATIC CIRRHOSIS, UNSPECIFIED HEPATIC CIRRHOSIS TYPE, UNSPECIFIED WHETHER ASCITES PRESENT (MULTI): ICD-10-CM

## 2025-04-10 DIAGNOSIS — K76.9 HEPATIC LESION: Primary | ICD-10-CM

## 2025-04-10 DIAGNOSIS — I48.0 PAROXYSMAL ATRIAL FIBRILLATION (MULTI): ICD-10-CM

## 2025-04-11 RX ORDER — APIXABAN 5 MG/1
5 TABLET, FILM COATED ORAL 2 TIMES DAILY
Qty: 60 TABLET | Refills: 11 | Status: SHIPPED | OUTPATIENT
Start: 2025-04-11

## 2025-04-14 DIAGNOSIS — I48.0 PAROXYSMAL ATRIAL FIBRILLATION (MULTI): ICD-10-CM

## 2025-04-14 RX ORDER — METOPROLOL SUCCINATE 50 MG/1
50 TABLET, EXTENDED RELEASE ORAL DAILY
Qty: 90 TABLET | Refills: 3 | Status: SHIPPED | OUTPATIENT
Start: 2025-04-14 | End: 2026-04-14

## 2025-04-18 LAB
A1AT SERPL-MCNC: 133 MG/DL (ref 83–199)
AFP-TM SERPL-MCNC: 2.8 NG/ML
ANA PAT SER IF-IMP: ABNORMAL
ANA SER QL IF: POSITIVE
ANA TITR SER IF: ABNORMAL TITER
CENTROMERE B AB SER-ACNC: ABNORMAL AI
CERULOPLASMIN SERPL-MCNC: 22 MG/DL (ref 14–30)
CREAT SERPL-MCNC: 0.81 MG/DL (ref 0.7–1.35)
DSDNA AB SER-ACNC: <1 IU/ML
EGFRCR SERPLBLD CKD-EPI 2021: 98 ML/MIN/1.73M2
ENA JO1 AB SER IA-ACNC: ABNORMAL AI
ENA RNP AB SER-ACNC: ABNORMAL AI
ENA SCL70 AB SER IA-ACNC: ABNORMAL AI
ENA SM AB SER IA-ACNC: ABNORMAL AI
ENA SM+RNP AB SER IA-ACNC: ABNORMAL AI
ENA SS-A AB SER IA-ACNC: ABNORMAL AI
ENA SS-B AB SER IA-ACNC: ABNORMAL AI
INR PPP: 1.1
LABORATORY COMMENT REPORT: ABNORMAL
MITOCHONDRIA AB SER QL IF: NEGATIVE
NUCLEOSOME AB SER IA-ACNC: ABNORMAL AI
PROTHROMBIN TIME: 11.6 SEC (ref 9–11.5)
RIBOSOMAL P AB SER-ACNC: ABNORMAL AI

## 2025-04-29 ENCOUNTER — APPOINTMENT (OUTPATIENT)
Dept: CARDIOLOGY | Facility: CLINIC | Age: 65
End: 2025-04-29
Payer: COMMERCIAL

## 2025-04-29 VITALS
BODY MASS INDEX: 26.07 KG/M2 | HEART RATE: 56 BPM | WEIGHT: 176 LBS | DIASTOLIC BLOOD PRESSURE: 81 MMHG | SYSTOLIC BLOOD PRESSURE: 133 MMHG | HEIGHT: 69 IN

## 2025-04-29 DIAGNOSIS — I48.0 PAROXYSMAL ATRIAL FIBRILLATION (MULTI): ICD-10-CM

## 2025-04-29 PROCEDURE — 99214 OFFICE O/P EST MOD 30 MIN: CPT | Performed by: INTERNAL MEDICINE

## 2025-04-29 PROCEDURE — 99212 OFFICE O/P EST SF 10 MIN: CPT | Mod: 25 | Performed by: INTERNAL MEDICINE

## 2025-04-29 PROCEDURE — 3079F DIAST BP 80-89 MM HG: CPT | Performed by: INTERNAL MEDICINE

## 2025-04-29 PROCEDURE — 3008F BODY MASS INDEX DOCD: CPT | Performed by: INTERNAL MEDICINE

## 2025-04-29 PROCEDURE — 93005 ELECTROCARDIOGRAM TRACING: CPT | Performed by: INTERNAL MEDICINE

## 2025-04-29 PROCEDURE — 3075F SYST BP GE 130 - 139MM HG: CPT | Performed by: INTERNAL MEDICINE

## 2025-04-29 PROCEDURE — 1036F TOBACCO NON-USER: CPT | Performed by: INTERNAL MEDICINE

## 2025-04-29 PROCEDURE — 93010 ELECTROCARDIOGRAM REPORT: CPT | Performed by: INTERNAL MEDICINE

## 2025-04-29 ASSESSMENT — PATIENT HEALTH QUESTIONNAIRE - PHQ9
2. FEELING DOWN, DEPRESSED OR HOPELESS: NOT AT ALL
1. LITTLE INTEREST OR PLEASURE IN DOING THINGS: NOT AT ALL
SUM OF ALL RESPONSES TO PHQ9 QUESTIONS 1 AND 2: 0

## 2025-04-29 ASSESSMENT — ENCOUNTER SYMPTOMS
LOSS OF SENSATION IN FEET: 0
DEPRESSION: 0
OCCASIONAL FEELINGS OF UNSTEADINESS: 0

## 2025-04-29 ASSESSMENT — PAIN SCALES - GENERAL: PAINLEVEL_OUTOF10: 0-NO PAIN

## 2025-04-29 NOTE — PROGRESS NOTES
Referred by Dr. Hernandez ref. provider found provider found for No chief complaint on file.       Dandre Carroll is a 64 y.o. year old male patient with h/o A Fib s/p RFA 3 months ago. Presents for follow up.     PMHx/PSHx: As above    FamHx: unremarkable     Allergies:  RX Allergies[1]     Review of Systems    Constitutional: not feeling tired.   Eyes: no eyesight problems.   ENT: no hearing loss and no nosebleeds.   Cardiovascular: no intermittent leg claudication and as noted in HPI.   Respiratory: no chronic cough and no shortness of breath.   Gastrointestinal: no change in bowel habits and no blood in stools.   Genitourinary: no urinary frequency and no hematuria.   Skin: no skin rashes.   Neurological: no seizures and no frequent falls.   Psychiatric: no depression and not suicidal.   All other systems have been reviewed and are negative for complaint.     Outpatient Medications:  Current Outpatient Medications   Medication Instructions    amiodarone (Pacerone) 200 mg tablet Take 2 tablets (400 mg) by mouth 2 times a day for 7 days, THEN 1 tablet (200 mg) 2 times a day for 7 days, THEN 1 tablet (200 mg) once daily.    Eliquis 5 mg, oral, 2 times daily    folic acid (Folvite) 1 mg tablet 1 tablet, Daily    furosemide (Lasix) 20 mg tablet TAKE 1 TABLET BY MOUTH DAILY AS DIRECTED AS NEEDED FOR WEIGHT GAIN    glucosamine-chondroitin 250-200 mg tablet Take by mouth.    lisinopril 10 mg, oral, Daily, as directed    metoprolol succinate XL (Toprol-XL) 50 mg 24 hr tablet Take 1 tablet (50 mg) by mouth once daily. Do not crush or chew. Do not start before February 14, 2025.    prednisoLONE acetate (Pred-Forte) 1 % ophthalmic suspension instill 1 drop into right eye 4 times a day    sildenafil (VIAGRA) 50 mg, oral, Daily PRN    sodium,potassium,mag sulfates (Suprep) 17.5-3.13-1.6 gram solution Take one bottle beginning at 6pm night before procedure and then take the other bottle 5 hours before procedure time as directed per  "instruction sheet    thiamine 100 mg tablet 1 tablet, Daily         Last Recorded Vitals:      2/13/2025     3:00 PM 2/13/2025     3:15 PM 2/13/2025     3:30 PM 2/13/2025     3:45 PM 2/13/2025     4:00 PM 3/18/2025     8:22 AM 3/19/2025    10:32 AM   Vitals   Systolic 117 130 127 115 129 147 152   Diastolic 88 79 67 68 72 75 84   BP Location      Left arm Left arm   Heart Rate 152 87 91 96 92 68 62   Temp 36.3 °C (97.3 °F)    36.5 °C (97.7 °F)  36.7 °C (98 °F)   Resp 16 16 16 16 17     Height      1.778 m (5' 10\")    Weight (lb)      175.31 172.4   BMI      25.15 kg/m2 24.74 kg/m2   BSA (m2)      1.98 m2 1.97 m2   Visit Report      Report Report    Visit Vitals  Smoking Status Former        Physical Exam:  Constitutional: alert and in no acute distress.   Eyes: no erythema, swelling or discharge from the eye .   Neck: neck is supple, symmetric, trachea midline, no masses  and no thyromegaly .   Pulmonary: no increased work of breathing or signs of respiratory distress  and lungs clear to auscultation.    Cardiovascular: carotid pulses 2+ bilaterally with no bruit , JVP was normal, no thrills , regular rhythm, normal S1 and S2, no murmurs , pedal pulses 2+ bilaterally  and no edema .   Abdomen: abdomen non-tender, no masses  and no hepatomegaly .   Skin: skin warm and dry, normal skin turgor .   Psychiatric judgment and insight is normal  and oriented to person, place and time .        Assessment/Plan   Problem List Items Addressed This Visit           ICD-10-CM    Paroxysmal atrial fibrillation (Multi) I48.0       Dandre Carroll is a 64 y.o. year old male patient with h/o A Fib s/p RFA 3 months ago. Presents for follow up.   The patient reports doing well and denies symptoms. Will stop the amiodarone and continue the rest of medications. Will follow up in 3 months (6 months from ablation).         Renato Reeves MD  Cardiac Electrophysiology      Thank you very much for allowing me to participate in the care of this " pleasant patient. Please do not hesitate to contact me with any further questions or concerns regarding his care.    **Disclaimer: This note was dictated by speech recognition, and every effort has been made to prevent any error in transcription, however minor errors may be present**         [1]   Allergies  Allergen Reactions    Bee Venom Protein (Honey Bee) Swelling    Cat Dander Itching     Sinus congestion

## 2025-04-30 LAB
ATRIAL RATE: 56 BPM
P AXIS: 19 DEGREES
P OFFSET: 188 MS
P ONSET: 124 MS
PR INTERVAL: 188 MS
Q ONSET: 218 MS
QRS COUNT: 9 BEATS
QRS DURATION: 98 MS
QT INTERVAL: 506 MS
QTC CALCULATION(BAZETT): 488 MS
QTC FREDERICIA: 495 MS
R AXIS: 62 DEGREES
T AXIS: 57 DEGREES
T OFFSET: 471 MS
VENTRICULAR RATE: 56 BPM

## 2025-05-01 ENCOUNTER — HOSPITAL ENCOUNTER (OUTPATIENT)
Dept: RADIOLOGY | Facility: CLINIC | Age: 65
Discharge: HOME | End: 2025-05-01
Payer: COMMERCIAL

## 2025-05-01 DIAGNOSIS — K76.9 HEPATIC LESION: ICD-10-CM

## 2025-05-01 PROCEDURE — 74183 MRI ABD W/O CNTR FLWD CNTR: CPT

## 2025-05-01 PROCEDURE — A9575 INJ GADOTERATE MEGLUMI 0.1ML: HCPCS | Mod: JZ | Performed by: NURSE PRACTITIONER

## 2025-05-01 PROCEDURE — 2550000001 HC RX 255 CONTRASTS: Mod: JZ | Performed by: NURSE PRACTITIONER

## 2025-05-01 RX ORDER — GADOTERATE MEGLUMINE 376.9 MG/ML
15 INJECTION INTRAVENOUS
Status: COMPLETED | OUTPATIENT
Start: 2025-05-01 | End: 2025-05-01

## 2025-05-01 RX ADMIN — GADOTERATE MEGLUMINE 15 ML: 376.9 INJECTION INTRAVENOUS at 12:58

## 2025-05-02 ENCOUNTER — OFFICE VISIT (OUTPATIENT)
Dept: GASTROENTEROLOGY | Facility: CLINIC | Age: 65
End: 2025-05-02
Payer: COMMERCIAL

## 2025-05-02 VITALS — WEIGHT: 174 LBS | BODY MASS INDEX: 27.97 KG/M2 | HEIGHT: 66 IN

## 2025-05-02 DIAGNOSIS — Z86.0101 ENCOUNTER FOR COLONOSCOPY DUE TO HISTORY OF ADENOMATOUS COLONIC POLYPS: ICD-10-CM

## 2025-05-02 DIAGNOSIS — K76.9 HEPATIC LESION: ICD-10-CM

## 2025-05-02 DIAGNOSIS — D49.0 IPMN (INTRADUCTAL PAPILLARY MUCINOUS NEOPLASM): Primary | ICD-10-CM

## 2025-05-02 DIAGNOSIS — Z12.11 ENCOUNTER FOR COLONOSCOPY DUE TO HISTORY OF ADENOMATOUS COLONIC POLYPS: ICD-10-CM

## 2025-05-02 PROCEDURE — 1036F TOBACCO NON-USER: CPT | Performed by: INTERNAL MEDICINE

## 2025-05-02 PROCEDURE — 99204 OFFICE O/P NEW MOD 45 MIN: CPT | Performed by: INTERNAL MEDICINE

## 2025-05-02 PROCEDURE — 3008F BODY MASS INDEX DOCD: CPT | Performed by: INTERNAL MEDICINE

## 2025-05-02 ASSESSMENT — ENCOUNTER SYMPTOMS: SHORTNESS OF BREATH: 0

## 2025-05-02 NOTE — PROGRESS NOTES
REASON FOR VISIT:  Colon polyps  PCP (requesting provider): Soumya Chowdary, APRN-CNP.    HPI:  Dandre Carroll is a 64 y.o. male with a past medical history of Afib s/p RFA on Eliquis and HTN being evaluated for personal history of adenomatous colon polyps and abnormal imaging of the liver and pancreas. RUQ U/S showed coarse heterogeneous liver parenchyma suggestive of cirrhosis, stable two 5-7 mm gallbladder polyps, and increase in size of heterogeneous mildly hyperechoic 2.7 cm liver mass in right lobe (4/2025). MRI liver showed subtle undulating contour of liver with mild volume redistribution towards left lobe which is non-specific, 3.2 cm hepatic hemangioma, 5 mm gallbladder polyp stable since 2017 so no follow-up recommended, and 6 mm lesion in pancreatic body likely side branch IPMN (4/2025).     No history of liver disease. No history of heavy alcohol use. No jaundice history. No blood transfusions. No IVDU. No tattoos. He is due for colonoscopy. He had Afib ablation and he is on Eliquis. He spoke to his Cardiologist and he is ok to hold his Eliquis for 2 days prior to the procedure. No regular NSAID use. He has a BM on a regular basis. No blood in the stool.     PSurgHx: No abdominal surgeries     FamHx: Mother with colon cancer (age 60s).     Prior Endoscopy:  -Colonoscopy (11/2019): Good prep, normal TI, 8 mm cecal polyp (TA), moderate sigmoid diverticulosis, small IH/EH, otherwise normal colon.    PAST MEDICAL HISTORY  Medical History[1]    PAST SURGICAL HISTORY  Surgical History[2]    FAMILY HISTORY  Family History[3]    SOCIAL HISTORY   reports that he quit smoking about 39 years ago. His smoking use included cigarettes. He started smoking about 50 years ago. He has a 5.5 pack-year smoking history. He has never used smokeless tobacco. He reports current alcohol use of about 14.0 standard drinks of alcohol per week. He reports that he does not use drugs.    REVIEW OF SYSTEMS  Review of Systems    Respiratory:  Negative for shortness of breath.    Cardiovascular:  Negative for chest pain.   All other systems reviewed and are negative.    A 10+ point review of systems was otherwise negative except as noted and per HPI.    ALLERGIES  Allergies[4]    MEDICATIONS  Current Outpatient Medications   Medication Instructions    Eliquis 5 mg, oral, 2 times daily    folic acid (Folvite) 1 mg tablet 1 tablet, Daily    furosemide (Lasix) 20 mg tablet TAKE 1 TABLET BY MOUTH DAILY AS DIRECTED AS NEEDED FOR WEIGHT GAIN    glucosamine-chondroitin 250-200 mg tablet Take by mouth.    lisinopril 10 mg, oral, Daily, as directed    metoprolol succinate XL (Toprol-XL) 50 mg 24 hr tablet Take 1 tablet (50 mg) by mouth once daily. Do not crush or chew. Do not start before February 14, 2025.    sildenafil (VIAGRA) 50 mg, oral, Daily PRN    sodium,potassium,mag sulfates (Suprep) 17.5-3.13-1.6 gram solution Take one bottle beginning at 6pm night before procedure and then take the other bottle 5 hours before procedure time as directed per instruction sheet    thiamine 100 mg tablet 1 tablet, Daily       VITALS  There were no vitals filed for this visit.   There is no height or weight on file to calculate BMI.    PHYSICAL EXAM  CONSTITUTIONAL: NAD, appears stated age  EYES: anicteric sclera, sclera clear  HEAD: normocephalic, atraumatic   NECK: supple   PULMONARY: CTAB  CARDIOVASCULAR: RRR, no M/R/G appreciated   ABDOMEN: soft, NTND, +BS, no rebound or guarding   MUSCULOSKELETAL: no edema  SKIN: no jaundice   PSYCHIATRIC: AOx3, appropriate insight and judgement    LABS  WBC   Date Value   03/14/2024 5.0 x10*3/uL   10/13/2021 4.8 x10E9/L   11/04/2019 6.5 x10E9/L   10/22/2019 4.9 x10E9/L     WHITE BLOOD CELL COUNT (Thousand/uL)   Date Value   02/06/2025 6.6     Hemoglobin (g/dL)   Date Value   03/14/2024 13.9   10/13/2021 12.1 (L)   11/04/2019 13.0 (L)   10/22/2019 12.3 (L)     HEMOGLOBIN (g/dL)   Date Value   02/06/2025 15.0      Platelets   Date Value   03/14/2024 237 x10*3/uL   10/13/2021 249 x10E9/L   11/04/2019 258 x10E9/L   10/22/2019 232 x10E9/L     PLATELET COUNT (Thousand/uL)   Date Value   02/06/2025 281     SODIUM (mmol/L)   Date Value   02/06/2025 135     Sodium (mmol/L)   Date Value   03/14/2024 134 (L)   10/13/2021 132 (L)   11/04/2019 131 (L)   10/22/2019 127 (L)     POTASSIUM (mmol/L)   Date Value   02/06/2025 4.9     Potassium (mmol/L)   Date Value   03/14/2024 5.0   10/13/2021 4.9   11/04/2019 4.6   10/22/2019 4.8     CHLORIDE (mmol/L)   Date Value   02/06/2025 98     Chloride (mmol/L)   Date Value   03/14/2024 98   10/13/2021 99   11/04/2019 97 (L)   10/22/2019 95 (L)     CARBON DIOXIDE (mmol/L)   Date Value   02/06/2025 26     Bicarbonate (mmol/L)   Date Value   03/14/2024 29   10/13/2021 24   11/04/2019 26   10/22/2019 24     UREA NITROGEN (BUN) (mg/dL)   Date Value   02/06/2025 13     Urea Nitrogen (mg/dL)   Date Value   03/14/2024 14   10/13/2021 10   11/04/2019 8   10/22/2019 6     Creatinine (mg/dL)   Date Value   03/14/2024 0.94   10/13/2021 0.77   11/04/2019 0.70   10/22/2019 0.66     CREATININE (mg/dL)   Date Value   04/16/2025 0.81   02/06/2025 0.90     CALCIUM (mg/dL)   Date Value   02/06/2025 9.8     Calcium (mg/dL)   Date Value   03/14/2024 9.5   10/13/2021 9.6   11/04/2019 9.6   10/22/2019 9.6     PROTEIN, TOTAL (g/dL)   Date Value   03/20/2025 7.1     Total Protein (g/dL)   Date Value   03/14/2024 6.8   10/13/2021 6.9   10/22/2019 6.9   10/22/2017 6.1 (L)     BILIRUBIN, TOTAL (mg/dL)   Date Value   03/20/2025 0.7     Bilirubin, Total (mg/dL)   Date Value   03/14/2024 0.9     Total Bilirubin (mg/dL)   Date Value   10/13/2021 0.8   10/22/2019 0.8   10/22/2017 1.7 (H)     ALKALINE PHOSPHATASE (U/L)   Date Value   03/20/2025 45     Alkaline Phosphatase (U/L)   Date Value   03/14/2024 51   10/13/2021 65   10/22/2019 48   10/22/2017 57     ALT (U/L)   Date Value   03/20/2025 10   03/14/2024 12     ALT (SGPT)  "(U/L)   Date Value   10/13/2021 13   10/22/2019 11   10/22/2017 55 (H)     AST (U/L)   Date Value   03/20/2025 18   03/14/2024 16   10/13/2021 21   10/22/2019 18   10/22/2017 43 (H)     GLUCOSE (mg/dL)   Date Value   02/06/2025 89     Glucose (mg/dL)   Date Value   03/14/2024 88   10/13/2021 90   11/04/2019 82   10/22/2019 75     No results found for: \"LIPASE\", \"CRP\"    ASSESSMENT/PLAN  Dandre Carroll is a 64 y.o. male with a past medical history of Afib s/p RFA on Eliquis and HTN being evaluated for personal history of adenomatous colon polyps, pancreatic IPMN, and abnormal imaging of the liver. RUQ U/S showed coarse heterogeneous liver parenchyma suggestive of cirrhosis, stable two 5-7 mm gallbladder polyps, and increase in size of heterogeneous mildly hyperechoic 2.7 cm liver mass in right lobe (4/2025). MRI liver showed subtle undulating contour of liver with mild volume redistribution towards left lobe which is non-specific, 3.2 cm hepatic hemangioma, 5 mm gallbladder polyp stable since 2017 so no follow-up recommended, and 6 mm lesion in pancreatic body likely side branch IPMN (4/2025). No evidence of cirrhosis or portal hypertension on the MRI, no clinical history of liver disease, and FIB-4 score 1.3 not indicative of significant fibrosis. We discussed that a liver hemangioma is a benign finding and also that the gallbladder polyp requires no further evaluation. He does have a small pancreatic IPMN which we will monitor with a repeat MRI in 1 year. He is due for surveillance colonoscopy and has been cleared by Cardiology to hold the Eliquis for 2 days prior to the procedure.     -Plan for colonoscopy  -The procedure(s) including risks/benefits, diet restrictions, prep, and sedation were discussed with the patient  -Hold Eliquis for 2 days prior to the procedure  -Recommend repeat MRCP in 1 year to monitor the IPMN  -If any future clinical concern for fibrosis/cirrhosis, then would plan to check Fibroscan "     Follow-up will be at the time of the colonoscopy.     Signature: Dandre Oliva MD       [1]   Past Medical History:  Diagnosis Date    Atrial fibrillation (Multi)     Cataract 2012    Heart disease 2017    Hypertension     Personal history of other diseases of the circulatory system 01/28/2019    History of cardiomyopathy   [2]   Past Surgical History:  Procedure Laterality Date    ABLATION OF DYSRHYTHMIC FOCUS  2/13/25    CARDIAC ELECTROPHYSIOLOGY PROCEDURE N/A 02/13/2025    Procedure: Ablation A-Fib Paroxysmal;  Surgeon: Renato Reeves MD;  Location: [facility] Cardiac [facility];  Service: Electrophysiology;  Laterality: N/A;    CATARACT EXTRACTION Left 2013    CATARACT EXTRACTION Right 2024    COLONOSCOPY  2020    EYE SURGERY  2024    EYE SURGERY Right 03/05/2025    epiretinal membrane    TONSILLECTOMY  1970   [3]   Family History  Problem Relation Name Age of Onset    Colon cancer Mother Tara Carroll     Aortic aneurysm Father     [4]   Allergies  Allergen Reactions    Bee Venom Protein (Honey Bee) Swelling    Cat Dander Itching     Sinus congestion

## 2025-05-21 ENCOUNTER — APPOINTMENT (OUTPATIENT)
Dept: GASTROENTEROLOGY | Facility: EXTERNAL LOCATION | Age: 65
End: 2025-05-21
Payer: COMMERCIAL

## 2025-05-22 ENCOUNTER — APPOINTMENT (OUTPATIENT)
Dept: GASTROENTEROLOGY | Facility: EXTERNAL LOCATION | Age: 65
End: 2025-05-22
Payer: COMMERCIAL

## 2025-06-11 ENCOUNTER — APPOINTMENT (OUTPATIENT)
Dept: GASTROENTEROLOGY | Facility: CLINIC | Age: 65
End: 2025-06-11
Payer: COMMERCIAL

## 2025-06-12 ENCOUNTER — APPOINTMENT (OUTPATIENT)
Dept: GASTROENTEROLOGY | Facility: EXTERNAL LOCATION | Age: 65
End: 2025-06-12
Payer: COMMERCIAL

## 2025-06-12 DIAGNOSIS — D12.8 BENIGN NEOPLASM OF RECTUM: ICD-10-CM

## 2025-06-12 DIAGNOSIS — Z12.11 ENCOUNTER FOR COLONOSCOPY DUE TO HISTORY OF ADENOMATOUS COLONIC POLYPS: ICD-10-CM

## 2025-06-12 DIAGNOSIS — Z86.0101 ENCOUNTER FOR COLONOSCOPY DUE TO HISTORY OF ADENOMATOUS COLONIC POLYPS: ICD-10-CM

## 2025-06-12 DIAGNOSIS — D12.3 BENIGN NEOPLASM OF TRANSVERSE COLON: Primary | ICD-10-CM

## 2025-06-12 PROCEDURE — 45385 COLONOSCOPY W/LESION REMOVAL: CPT | Performed by: INTERNAL MEDICINE

## 2025-06-12 PROCEDURE — 88305 TISSUE EXAM BY PATHOLOGIST: CPT

## 2025-06-12 PROCEDURE — 0753T DGTZ GLS MCRSCP SLD LEVEL IV: CPT

## 2025-06-12 PROCEDURE — 88305 TISSUE EXAM BY PATHOLOGIST: CPT | Performed by: PATHOLOGY

## 2025-06-14 ENCOUNTER — LAB REQUISITION (OUTPATIENT)
Dept: LAB | Facility: HOSPITAL | Age: 65
End: 2025-06-14
Payer: COMMERCIAL

## 2025-06-24 LAB
LABORATORY COMMENT REPORT: NORMAL
PATH REPORT.FINAL DX SPEC: NORMAL
PATH REPORT.GROSS SPEC: NORMAL
PATH REPORT.RELEVANT HX SPEC: NORMAL
PATH REPORT.TOTAL CANCER: NORMAL

## 2025-06-26 DIAGNOSIS — I48.0 PAROXYSMAL ATRIAL FIBRILLATION (MULTI): ICD-10-CM

## 2025-06-26 RX ORDER — LISINOPRIL 10 MG/1
10 TABLET ORAL DAILY
Qty: 90 TABLET | Refills: 0 | Status: SHIPPED | OUTPATIENT
Start: 2025-06-26

## 2025-07-29 ENCOUNTER — OFFICE VISIT (OUTPATIENT)
Dept: CARDIOLOGY | Facility: CLINIC | Age: 65
End: 2025-07-29
Payer: COMMERCIAL

## 2025-07-29 VITALS
WEIGHT: 179.2 LBS | HEIGHT: 69 IN | SYSTOLIC BLOOD PRESSURE: 123 MMHG | DIASTOLIC BLOOD PRESSURE: 76 MMHG | BODY MASS INDEX: 26.54 KG/M2 | OXYGEN SATURATION: 94 % | HEART RATE: 74 BPM

## 2025-07-29 DIAGNOSIS — I48.0 PAROXYSMAL ATRIAL FIBRILLATION (MULTI): ICD-10-CM

## 2025-07-29 LAB
ATRIAL RATE: 74 BPM
P AXIS: 28 DEGREES
P OFFSET: 189 MS
P ONSET: 134 MS
PR INTERVAL: 172 MS
Q ONSET: 220 MS
QRS COUNT: 12 BEATS
QRS DURATION: 84 MS
QT INTERVAL: 430 MS
QTC CALCULATION(BAZETT): 477 MS
QTC FREDERICIA: 461 MS
R AXIS: 56 DEGREES
T AXIS: 60 DEGREES
T OFFSET: 435 MS
VENTRICULAR RATE: 74 BPM

## 2025-07-29 PROCEDURE — 99214 OFFICE O/P EST MOD 30 MIN: CPT | Performed by: INTERNAL MEDICINE

## 2025-07-29 PROCEDURE — 3078F DIAST BP <80 MM HG: CPT | Performed by: INTERNAL MEDICINE

## 2025-07-29 PROCEDURE — 99212 OFFICE O/P EST SF 10 MIN: CPT | Mod: 25 | Performed by: INTERNAL MEDICINE

## 2025-07-29 PROCEDURE — 3074F SYST BP LT 130 MM HG: CPT | Performed by: INTERNAL MEDICINE

## 2025-07-29 PROCEDURE — 3008F BODY MASS INDEX DOCD: CPT | Performed by: INTERNAL MEDICINE

## 2025-07-29 PROCEDURE — 93005 ELECTROCARDIOGRAM TRACING: CPT | Performed by: INTERNAL MEDICINE

## 2025-07-29 PROCEDURE — 93010 ELECTROCARDIOGRAM REPORT: CPT | Performed by: INTERNAL MEDICINE

## 2025-07-29 ASSESSMENT — ENCOUNTER SYMPTOMS
LOSS OF SENSATION IN FEET: 0
DEPRESSION: 0
OCCASIONAL FEELINGS OF UNSTEADINESS: 0

## 2025-07-29 ASSESSMENT — PAIN SCALES - GENERAL: PAINLEVEL_OUTOF10: 0-NO PAIN

## 2025-07-29 NOTE — PROGRESS NOTES
Referred by Renato Garcia MD provider found for No chief complaint on file.       Dandre Carroll is a 64 y.o. year old male patient with h/o A Fib s/p RFA 6 months ago. Presents for follow up.     PMHx/PSHx: As above    FamHx: unremarkable     Allergies:  RX Allergies[1]     Review of Systems    Constitutional: not feeling tired.   Eyes: no eyesight problems.   ENT: no hearing loss and no nosebleeds.   Cardiovascular: no intermittent leg claudication and as noted in HPI.   Respiratory: no chronic cough and no shortness of breath.   Gastrointestinal: no change in bowel habits and no blood in stools.   Genitourinary: no urinary frequency and no hematuria.   Skin: no skin rashes.   Neurological: no seizures and no frequent falls.   Psychiatric: no depression and not suicidal.   All other systems have been reviewed and are negative for complaint.     Outpatient Medications:  Current Outpatient Medications   Medication Instructions    Eliquis 5 mg, oral, 2 times daily    folic acid (Folvite) 1 mg tablet 1 tablet, Daily    furosemide (Lasix) 20 mg tablet TAKE 1 TABLET BY MOUTH DAILY AS DIRECTED AS NEEDED FOR WEIGHT GAIN    glucosamine-chondroitin 250-200 mg tablet Take by mouth.    lisinopril 10 mg, oral, Daily, as directed    metoprolol succinate XL (Toprol-XL) 50 mg 24 hr tablet Take 1 tablet (50 mg) by mouth once daily. Do not crush or chew. Do not start before February 14, 2025.    sildenafil (VIAGRA) 50 mg, oral, Daily PRN    sodium,potassium,mag sulfates (Suprep) 17.5-3.13-1.6 gram solution Take one bottle beginning at 6pm night before procedure and then take the other bottle 5 hours before procedure time as directed per instruction sheet    thiamine 100 mg tablet 1 tablet, Daily         Last Recorded Vitals:      2/13/2025     3:45 PM 2/13/2025     4:00 PM 3/18/2025     8:22 AM 3/19/2025    10:32 AM 4/29/2025     3:05 PM 5/2/2025     2:38 PM 7/29/2025     1:02 PM   Vitals   Systolic 115 129 147 152 133  123  "  Diastolic 68 72 75 84 81  76   BP Location   Left arm Left arm Left arm  Left arm   Heart Rate 96 92 68 62 56  74   Temp  36.5 °C (97.7 °F)  36.7 °C (98 °F)      Resp 16 17        Height   1.778 m (5' 10\")  1.753 m (5' 9\") 1.676 m (5' 6\") 1.753 m (5' 9\")   Weight (lb)   175.31 172.4 176 174 179.2   BMI   25.15 kg/m2 24.74 kg/m2 25.99 kg/m2 28.08 kg/m2 26.46 kg/m2   BSA (m2)   1.98 m2 1.97 m2 1.97 m2 1.92 m2 1.99 m2   Visit Report   Report Report Report Report Report    Visit Vitals  /76 (BP Location: Left arm, Patient Position: Sitting, BP Cuff Size: Adult)   Pulse 74   Ht 1.753 m (5' 9\")   Wt 81.3 kg (179 lb 3.2 oz)   SpO2 94%   BMI 26.46 kg/m²   Smoking Status Former   BSA 1.99 m²        Physical Exam:  Constitutional: alert and in no acute distress.   Eyes: no erythema, swelling or discharge from the eye .   Neck: neck is supple, symmetric, trachea midline, no masses  and no thyromegaly .   Pulmonary: no increased work of breathing or signs of respiratory distress  and lungs clear to auscultation.    Cardiovascular: carotid pulses 2+ bilaterally with no bruit , JVP was normal, no thrills , regular rhythm, normal S1 and S2, no murmurs , pedal pulses 2+ bilaterally  and no edema .   Abdomen: abdomen non-tender, no masses  and no hepatomegaly .   Skin: skin warm and dry, normal skin turgor .   Psychiatric judgment and insight is normal  and oriented to person, place and time .        Assessment/Plan   Problem List Items Addressed This Visit           ICD-10-CM    Paroxysmal atrial fibrillation (Multi) I48.0    Relevant Orders    ECG 12 lead (Clinic Performed)       Dandre Carroll is a 64 y.o. year old male patient with h/o A Fib s/p RFA 6 months ago. Presents for follow up.   The patient reports doing well and denies symptoms. His current ECG shows NSR with narrow QRS and HR of 74 bpm. He is on anticoagulation and metoprolol. Will continue his current treatment and follow up in 6 months (1 year from " ablation).         Renato Reeves MD  Cardiac Electrophysiology      Thank you very much for allowing me to participate in the care of this pleasant patient. Please do not hesitate to contact me with any further questions or concerns regarding his care.    **Disclaimer: This note was dictated by speech recognition, and every effort has been made to prevent any error in transcription, however minor errors may be present**         [1]   Allergies  Allergen Reactions    Bee Venom Protein (Honey Bee) Swelling    Cat Dander Itching     Sinus congestion

## 2025-08-15 DIAGNOSIS — I48.0 PAROXYSMAL ATRIAL FIBRILLATION (MULTI): ICD-10-CM

## 2025-08-18 RX ORDER — LISINOPRIL 10 MG/1
10 TABLET ORAL DAILY
Qty: 90 TABLET | Refills: 0 | Status: SHIPPED | OUTPATIENT
Start: 2025-08-18

## 2025-08-18 RX ORDER — FUROSEMIDE 20 MG/1
TABLET ORAL
Qty: 90 TABLET | Refills: 1 | Status: SHIPPED | OUTPATIENT
Start: 2025-08-18

## 2026-03-20 ENCOUNTER — APPOINTMENT (OUTPATIENT)
Dept: PRIMARY CARE | Facility: CLINIC | Age: 66
End: 2026-03-20
Payer: COMMERCIAL

## (undated) DEVICE — INTRODUCER, SHEATH, FAST-CATH, 8FR X 12CM, C-LOCK

## (undated) DEVICE — CATHETER, THERMOCOOL SMART TOUCH, SF, D-F CURVE

## (undated) DEVICE — CATHETER, ULTRASOUND, DIAGNOSTIC, ACUNAV, 10 FR X 90 CM

## (undated) DEVICE — TUBING SET, IRRIGATION, SMARTABLATE

## (undated) DEVICE — CATHETHER, CS, BI-DIRECTIONAL, 10 POLES, D-F TYPE

## (undated) DEVICE — Device

## (undated) DEVICE — INTRODUCER, HEMOSTASIS, STR/J .038 IN, 8.5FR 12CM

## (undated) DEVICE — CATHETER, PENTARAY, NAV ECO, 7FR, 2-6-2 SPACING, F CURVE

## (undated) DEVICE — PATCHES, EXTERNAL REFERENCE, CARTO3

## (undated) DEVICE — NEEDLE, TRANSSEPTAL, CURVE, W/STYLET, ADULT, 18 G X 71 CM

## (undated) DEVICE — INTRODUCER, CATHETER, HEMOSTASIS, FAST-CATH, 11 FR X 12 CM